# Patient Record
Sex: MALE | Race: WHITE | Employment: OTHER | URBAN - METROPOLITAN AREA
[De-identification: names, ages, dates, MRNs, and addresses within clinical notes are randomized per-mention and may not be internally consistent; named-entity substitution may affect disease eponyms.]

---

## 2018-08-29 ENCOUNTER — TRANSFERRED RECORDS (OUTPATIENT)
Dept: HEALTH INFORMATION MANAGEMENT | Facility: CLINIC | Age: 68
End: 2018-08-29

## 2018-08-29 LAB
ALT SERPL-CCNC: 23 U/L (ref 13–61)
AST SERPL-CCNC: 17 U/L (ref 15–37)
CHOLEST SERPL-MCNC: 211 MG/DL (ref 0–200)
CREAT SERPL-MCNC: 0.9 MG/DL (ref 0.7–1.2)
GFR SERPL CREATININE-BSD FRML MDRD: >60 ML/MIN/1.73M2
GLUCOSE SERPL-MCNC: 104 MG/DL (ref 70–100)
HBA1C MFR BLD: 5.2 % (ref 4–6)
HDLC SERPL-MCNC: 63 MG/DL
LDLC SERPL CALC-MCNC: 132 MG/DL
NONHDLC SERPL-MCNC: 148 MG/DL
POTASSIUM SERPL-SCNC: 4.2 MMOL/L (ref 3.5–5)
TRIGL SERPL-MCNC: 80 MG/DL
TSH SERPL-ACNC: 0.84 UIU/ML (ref 0.3–5)

## 2019-07-09 ENCOUNTER — HOSPITAL ENCOUNTER (INPATIENT)
Facility: HOSPITAL | Age: 69
LOS: 7 days | Discharge: NURSING, REHAB OR LONG-TERM CARE | DRG: 871 | End: 2019-07-16
Attending: FAMILY MEDICINE | Admitting: INTERNAL MEDICINE
Payer: COMMERCIAL

## 2019-07-09 ENCOUNTER — APPOINTMENT (OUTPATIENT)
Dept: GENERAL RADIOLOGY | Facility: HOSPITAL | Age: 69
DRG: 871 | End: 2019-07-09
Attending: FAMILY MEDICINE
Payer: COMMERCIAL

## 2019-07-09 DIAGNOSIS — I50.9 CONGESTIVE HEART FAILURE, UNSPECIFIED HF CHRONICITY, UNSPECIFIED HEART FAILURE TYPE (H): ICD-10-CM

## 2019-07-09 DIAGNOSIS — J18.9 PNEUMONIA OF LEFT LOWER LOBE DUE TO INFECTIOUS ORGANISM: ICD-10-CM

## 2019-07-09 LAB
ALBUMIN SERPL-MCNC: 3.4 G/DL (ref 3.4–5)
ALBUMIN UR-MCNC: 30 MG/DL
ALP SERPL-CCNC: 81 U/L (ref 40–150)
ALT SERPL W P-5'-P-CCNC: 82 U/L (ref 0–70)
ANION GAP SERPL CALCULATED.3IONS-SCNC: 3 MMOL/L (ref 3–14)
ANION GAP SERPL CALCULATED.3IONS-SCNC: 4 MMOL/L (ref 3–14)
APPEARANCE UR: ABNORMAL
AST SERPL W P-5'-P-CCNC: 51 U/L (ref 0–45)
BACTERIA #/AREA URNS HPF: ABNORMAL /HPF
BASOPHILS # BLD AUTO: 0.1 10E9/L (ref 0–0.2)
BASOPHILS NFR BLD AUTO: 0.5 %
BILIRUB SERPL-MCNC: 0.5 MG/DL (ref 0.2–1.3)
BILIRUB UR QL STRIP: NEGATIVE
BUN SERPL-MCNC: 39 MG/DL (ref 7–30)
BUN SERPL-MCNC: 48 MG/DL (ref 7–30)
CALCIUM SERPL-MCNC: 8.5 MG/DL (ref 8.5–10.1)
CALCIUM SERPL-MCNC: 9.5 MG/DL (ref 8.5–10.1)
CHLORIDE SERPL-SCNC: 118 MMOL/L (ref 94–109)
CHLORIDE SERPL-SCNC: 119 MMOL/L (ref 94–109)
CK SERPL-CCNC: 763 U/L (ref 30–300)
CO2 SERPL-SCNC: 29 MMOL/L (ref 20–32)
CO2 SERPL-SCNC: 29 MMOL/L (ref 20–32)
COLOR UR AUTO: YELLOW
CREAT SERPL-MCNC: 0.77 MG/DL (ref 0.66–1.25)
CREAT SERPL-MCNC: 0.94 MG/DL (ref 0.66–1.25)
DIFFERENTIAL METHOD BLD: ABNORMAL
EOSINOPHIL # BLD AUTO: 0 10E9/L (ref 0–0.7)
EOSINOPHIL NFR BLD AUTO: 0 %
ERYTHROCYTE [DISTWIDTH] IN BLOOD BY AUTOMATED COUNT: 17.1 % (ref 10–15)
GFR SERPL CREATININE-BSD FRML MDRD: 83 ML/MIN/{1.73_M2}
GFR SERPL CREATININE-BSD FRML MDRD: >90 ML/MIN/{1.73_M2}
GLUCOSE SERPL-MCNC: 114 MG/DL (ref 70–99)
GLUCOSE SERPL-MCNC: 161 MG/DL (ref 70–99)
GLUCOSE UR STRIP-MCNC: NEGATIVE MG/DL
HCT VFR BLD AUTO: 51.3 % (ref 40–53)
HGB BLD-MCNC: 16.8 G/DL (ref 13.3–17.7)
HGB UR QL STRIP: ABNORMAL
HYALINE CASTS #/AREA URNS LPF: 5 /LPF
IMM GRANULOCYTES # BLD: 0.1 10E9/L (ref 0–0.4)
IMM GRANULOCYTES NFR BLD: 0.7 %
KETONES UR STRIP-MCNC: NEGATIVE MG/DL
LACTATE BLD-SCNC: 2.2 MMOL/L (ref 0.7–2)
LACTATE BLD-SCNC: 2.6 MMOL/L (ref 0.7–2)
LACTATE BLD-SCNC: 3.3 MMOL/L (ref 0.7–2)
LEUKOCYTE ESTERASE UR QL STRIP: ABNORMAL
LYMPHOCYTES # BLD AUTO: 1.5 10E9/L (ref 0.8–5.3)
LYMPHOCYTES NFR BLD AUTO: 7.9 %
MCH RBC QN AUTO: 29.5 PG (ref 26.5–33)
MCHC RBC AUTO-ENTMCNC: 32.7 G/DL (ref 31.5–36.5)
MCV RBC AUTO: 90 FL (ref 78–100)
MONOCYTES # BLD AUTO: 2.1 10E9/L (ref 0–1.3)
MONOCYTES NFR BLD AUTO: 11.2 %
NEUTROPHILS # BLD AUTO: 15 10E9/L (ref 1.6–8.3)
NEUTROPHILS NFR BLD AUTO: 79.7 %
NITRATE UR QL: NEGATIVE
NRBC # BLD AUTO: 0 10*3/UL
NRBC BLD AUTO-RTO: 0 /100
NT-PROBNP SERPL-MCNC: 1610 PG/ML (ref 0–900)
PH UR STRIP: 5.5 PH (ref 4.7–8)
PLATELET # BLD AUTO: 348 10E9/L (ref 150–450)
POTASSIUM SERPL-SCNC: 4 MMOL/L (ref 3.4–5.3)
POTASSIUM SERPL-SCNC: 4.7 MMOL/L (ref 3.4–5.3)
PROCALCITONIN SERPL-MCNC: 0.32 NG/ML
PROT SERPL-MCNC: 8.5 G/DL (ref 6.8–8.8)
RBC # BLD AUTO: 5.7 10E12/L (ref 4.4–5.9)
RBC #/AREA URNS AUTO: 52 /HPF (ref 0–2)
SODIUM SERPL-SCNC: 150 MMOL/L (ref 133–144)
SODIUM SERPL-SCNC: 152 MMOL/L (ref 133–144)
SOURCE: ABNORMAL
SP GR UR STRIP: 1.02 (ref 1–1.03)
UROBILINOGEN UR STRIP-MCNC: NORMAL MG/DL (ref 0–2)
WBC # BLD AUTO: 18.9 10E9/L (ref 4–11)
WBC #/AREA URNS AUTO: >182 /HPF (ref 0–5)
WBC CLUMPS #/AREA URNS HPF: PRESENT /HPF

## 2019-07-09 PROCEDURE — 82550 ASSAY OF CK (CPK): CPT | Performed by: FAMILY MEDICINE

## 2019-07-09 PROCEDURE — 25800030 ZZH RX IP 258 OP 636: Performed by: FAMILY MEDICINE

## 2019-07-09 PROCEDURE — 40000786 ZZHCL STATISTIC ACTIVE MRSA SURVEILLANCE CULTURE: Performed by: INTERNAL MEDICINE

## 2019-07-09 PROCEDURE — 84145 PROCALCITONIN (PCT): CPT | Performed by: FAMILY MEDICINE

## 2019-07-09 PROCEDURE — 94660 CPAP INITIATION&MGMT: CPT

## 2019-07-09 PROCEDURE — 96361 HYDRATE IV INFUSION ADD-ON: CPT

## 2019-07-09 PROCEDURE — 5A09357 ASSISTANCE WITH RESPIRATORY VENTILATION, LESS THAN 24 CONSECUTIVE HOURS, CONTINUOUS POSITIVE AIRWAY PRESSURE: ICD-10-PCS | Performed by: INTERNAL MEDICINE

## 2019-07-09 PROCEDURE — 80053 COMPREHEN METABOLIC PANEL: CPT | Performed by: FAMILY MEDICINE

## 2019-07-09 PROCEDURE — 93010 ELECTROCARDIOGRAM REPORT: CPT | Performed by: INTERNAL MEDICINE

## 2019-07-09 PROCEDURE — 25000128 H RX IP 250 OP 636: Performed by: INTERNAL MEDICINE

## 2019-07-09 PROCEDURE — 81001 URINALYSIS AUTO W/SCOPE: CPT | Performed by: FAMILY MEDICINE

## 2019-07-09 PROCEDURE — 25800030 ZZH RX IP 258 OP 636: Performed by: INTERNAL MEDICINE

## 2019-07-09 PROCEDURE — 25800029 ZZH RX IP 258 OP 250: Performed by: INTERNAL MEDICINE

## 2019-07-09 PROCEDURE — 25000132 ZZH RX MED GY IP 250 OP 250 PS 637: Performed by: INTERNAL MEDICINE

## 2019-07-09 PROCEDURE — 99223 1ST HOSP IP/OBS HIGH 75: CPT | Performed by: INTERNAL MEDICINE

## 2019-07-09 PROCEDURE — 80048 BASIC METABOLIC PNL TOTAL CA: CPT | Performed by: INTERNAL MEDICINE

## 2019-07-09 PROCEDURE — 87040 BLOOD CULTURE FOR BACTERIA: CPT | Performed by: FAMILY MEDICINE

## 2019-07-09 PROCEDURE — 83880 ASSAY OF NATRIURETIC PEPTIDE: CPT | Performed by: FAMILY MEDICINE

## 2019-07-09 PROCEDURE — 36415 COLL VENOUS BLD VENIPUNCTURE: CPT | Performed by: INTERNAL MEDICINE

## 2019-07-09 PROCEDURE — 96365 THER/PROPH/DIAG IV INF INIT: CPT

## 2019-07-09 PROCEDURE — 96367 TX/PROPH/DG ADDL SEQ IV INF: CPT

## 2019-07-09 PROCEDURE — 87088 URINE BACTERIA CULTURE: CPT | Performed by: FAMILY MEDICINE

## 2019-07-09 PROCEDURE — 83605 ASSAY OF LACTIC ACID: CPT | Performed by: FAMILY MEDICINE

## 2019-07-09 PROCEDURE — 40000275 ZZH STATISTIC RCP TIME EA 10 MIN

## 2019-07-09 PROCEDURE — 99285 EMERGENCY DEPT VISIT HI MDM: CPT | Mod: Z6 | Performed by: FAMILY MEDICINE

## 2019-07-09 PROCEDURE — 83605 ASSAY OF LACTIC ACID: CPT | Performed by: INTERNAL MEDICINE

## 2019-07-09 PROCEDURE — 99285 EMERGENCY DEPT VISIT HI MDM: CPT | Mod: 25

## 2019-07-09 PROCEDURE — 25000128 H RX IP 250 OP 636: Performed by: FAMILY MEDICINE

## 2019-07-09 PROCEDURE — 85025 COMPLETE CBC W/AUTO DIFF WBC: CPT | Performed by: FAMILY MEDICINE

## 2019-07-09 PROCEDURE — 25000125 ZZHC RX 250: Performed by: FAMILY MEDICINE

## 2019-07-09 PROCEDURE — 71045 X-RAY EXAM CHEST 1 VIEW: CPT | Mod: TC

## 2019-07-09 PROCEDURE — 94640 AIRWAY INHALATION TREATMENT: CPT

## 2019-07-09 PROCEDURE — 36415 COLL VENOUS BLD VENIPUNCTURE: CPT | Performed by: FAMILY MEDICINE

## 2019-07-09 PROCEDURE — 25000132 ZZH RX MED GY IP 250 OP 250 PS 637: Performed by: FAMILY MEDICINE

## 2019-07-09 PROCEDURE — 87086 URINE CULTURE/COLONY COUNT: CPT | Performed by: FAMILY MEDICINE

## 2019-07-09 PROCEDURE — 20000003 ZZH R&B ICU

## 2019-07-09 PROCEDURE — 93005 ELECTROCARDIOGRAM TRACING: CPT

## 2019-07-09 RX ORDER — GABAPENTIN 300 MG/1
300 CAPSULE ORAL DAILY
Status: DISCONTINUED | OUTPATIENT
Start: 2019-07-10 | End: 2019-07-16 | Stop reason: HOSPADM

## 2019-07-09 RX ORDER — BUPROPION HYDROCHLORIDE 150 MG/1
150 TABLET, EXTENDED RELEASE ORAL 2 TIMES DAILY
Status: DISCONTINUED | OUTPATIENT
Start: 2019-07-10 | End: 2019-07-16 | Stop reason: HOSPADM

## 2019-07-09 RX ORDER — CEFTRIAXONE SODIUM 2 G/50ML
2 INJECTION, SOLUTION INTRAVENOUS ONCE
Status: COMPLETED | OUTPATIENT
Start: 2019-07-09 | End: 2019-07-09

## 2019-07-09 RX ORDER — ONDANSETRON 2 MG/ML
4 INJECTION INTRAMUSCULAR; INTRAVENOUS EVERY 6 HOURS PRN
Status: DISCONTINUED | OUTPATIENT
Start: 2019-07-09 | End: 2019-07-16 | Stop reason: HOSPADM

## 2019-07-09 RX ORDER — NICOTINE 21 MG/24HR
1 PATCH, TRANSDERMAL 24 HOURS TRANSDERMAL DAILY
Status: DISCONTINUED | OUTPATIENT
Start: 2019-07-09 | End: 2019-07-16 | Stop reason: HOSPADM

## 2019-07-09 RX ORDER — SODIUM CHLORIDE 9 MG/ML
1000 INJECTION, SOLUTION INTRAVENOUS CONTINUOUS
Status: DISCONTINUED | OUTPATIENT
Start: 2019-07-09 | End: 2019-07-10

## 2019-07-09 RX ORDER — MIRTAZAPINE 30 MG/1
45 TABLET, FILM COATED ORAL AT BEDTIME
Status: ON HOLD | COMMUNITY
End: 2020-01-01

## 2019-07-09 RX ORDER — NALOXONE HYDROCHLORIDE 0.4 MG/ML
.1-.4 INJECTION, SOLUTION INTRAMUSCULAR; INTRAVENOUS; SUBCUTANEOUS
Status: DISCONTINUED | OUTPATIENT
Start: 2019-07-09 | End: 2019-07-16 | Stop reason: HOSPADM

## 2019-07-09 RX ORDER — TAMSULOSIN HYDROCHLORIDE 0.4 MG/1
0.4 CAPSULE ORAL DAILY
Status: ON HOLD | COMMUNITY
End: 2019-07-09

## 2019-07-09 RX ORDER — ACETAMINOPHEN 325 MG/1
650 TABLET ORAL ONCE
Status: COMPLETED | OUTPATIENT
Start: 2019-07-09 | End: 2019-07-09

## 2019-07-09 RX ORDER — ONDANSETRON 4 MG/1
4 TABLET, ORALLY DISINTEGRATING ORAL EVERY 6 HOURS PRN
Status: DISCONTINUED | OUTPATIENT
Start: 2019-07-09 | End: 2019-07-16 | Stop reason: HOSPADM

## 2019-07-09 RX ORDER — IPRATROPIUM BROMIDE AND ALBUTEROL SULFATE 2.5; .5 MG/3ML; MG/3ML
3 SOLUTION RESPIRATORY (INHALATION)
Status: COMPLETED | OUTPATIENT
Start: 2019-07-09 | End: 2019-07-09

## 2019-07-09 RX ORDER — BUPROPION HYDROCHLORIDE 150 MG/1
150 TABLET, EXTENDED RELEASE ORAL 2 TIMES DAILY
Status: ON HOLD | COMMUNITY
End: 2019-07-09

## 2019-07-09 RX ORDER — MORPHINE SULFATE 4 MG/ML
1 INJECTION, SOLUTION INTRAMUSCULAR; INTRAVENOUS
Status: DISCONTINUED | OUTPATIENT
Start: 2019-07-09 | End: 2019-07-16 | Stop reason: HOSPADM

## 2019-07-09 RX ORDER — CEFTRIAXONE SODIUM 2 G/50ML
2 INJECTION, SOLUTION INTRAVENOUS EVERY 24 HOURS
Status: DISCONTINUED | OUTPATIENT
Start: 2019-07-10 | End: 2019-07-13

## 2019-07-09 RX ORDER — GABAPENTIN 300 MG/1
300 CAPSULE ORAL DAILY
Status: ON HOLD | COMMUNITY
End: 2019-07-09

## 2019-07-09 RX ORDER — TAMSULOSIN HYDROCHLORIDE 0.4 MG/1
0.4 CAPSULE ORAL DAILY
Status: DISCONTINUED | OUTPATIENT
Start: 2019-07-10 | End: 2019-07-16 | Stop reason: HOSPADM

## 2019-07-09 RX ORDER — ARIPIPRAZOLE 5 MG/1
5 TABLET ORAL DAILY
Status: DISCONTINUED | OUTPATIENT
Start: 2019-07-10 | End: 2019-07-16 | Stop reason: HOSPADM

## 2019-07-09 RX ORDER — SODIUM CHLORIDE 450 MG/100ML
INJECTION, SOLUTION INTRAVENOUS CONTINUOUS
Status: DISCONTINUED | OUTPATIENT
Start: 2019-07-09 | End: 2019-07-10

## 2019-07-09 RX ORDER — ARIPIPRAZOLE 5 MG/1
2.5 TABLET ORAL DAILY
Status: ON HOLD | COMMUNITY
End: 2020-01-01

## 2019-07-09 RX ORDER — ACETAMINOPHEN 325 MG/1
325-650 TABLET ORAL EVERY 6 HOURS PRN
Status: ON HOLD | COMMUNITY
End: 2019-07-10

## 2019-07-09 RX ORDER — LIDOCAINE 40 MG/G
CREAM TOPICAL
Status: DISCONTINUED | OUTPATIENT
Start: 2019-07-09 | End: 2019-07-16 | Stop reason: HOSPADM

## 2019-07-09 RX ADMIN — NICOTINE 1 PATCH: 21 PATCH, EXTENDED RELEASE TRANSDERMAL at 17:25

## 2019-07-09 RX ADMIN — IPRATROPIUM BROMIDE AND ALBUTEROL SULFATE 3 ML: .5; 3 SOLUTION RESPIRATORY (INHALATION) at 13:17

## 2019-07-09 RX ADMIN — MIRTAZAPINE 45 MG: 30 TABLET, FILM COATED ORAL at 22:22

## 2019-07-09 RX ADMIN — SODIUM CHLORIDE: 4.5 INJECTION, SOLUTION INTRAVENOUS at 17:25

## 2019-07-09 RX ADMIN — CEFTRIAXONE SODIUM 2 G: 2 INJECTION, SOLUTION INTRAVENOUS at 15:00

## 2019-07-09 RX ADMIN — SODIUM CHLORIDE, POTASSIUM CHLORIDE, SODIUM LACTATE AND CALCIUM CHLORIDE 1000 ML: 600; 310; 30; 20 INJECTION, SOLUTION INTRAVENOUS at 19:57

## 2019-07-09 RX ADMIN — ACETAMINOPHEN 650 MG: 325 TABLET, FILM COATED ORAL at 13:45

## 2019-07-09 RX ADMIN — ENOXAPARIN SODIUM 40 MG: 40 INJECTION SUBCUTANEOUS at 17:25

## 2019-07-09 RX ADMIN — IPRATROPIUM BROMIDE AND ALBUTEROL SULFATE 3 ML: .5; 3 SOLUTION RESPIRATORY (INHALATION) at 13:23

## 2019-07-09 RX ADMIN — AZITHROMYCIN 500 MG: 500 INJECTION, POWDER, LYOPHILIZED, FOR SOLUTION INTRAVENOUS at 13:45

## 2019-07-09 RX ADMIN — SODIUM CHLORIDE 1000 ML: 9 INJECTION, SOLUTION INTRAVENOUS at 13:07

## 2019-07-09 RX ADMIN — IPRATROPIUM BROMIDE AND ALBUTEROL SULFATE 3 ML: .5; 3 SOLUTION RESPIRATORY (INHALATION) at 13:33

## 2019-07-09 ASSESSMENT — ENCOUNTER SYMPTOMS
DIARRHEA: 1
ARTHRALGIAS: 0
CONFUSION: 1
NAUSEA: 0
CONSTIPATION: 0
ABDOMINAL PAIN: 0
ROS GI COMMENTS: INCONTINENT OF STOOL
ACTIVITY CHANGE: 1
FATIGUE: 1
DIAPHORESIS: 1
SHORTNESS OF BREATH: 1
SPEECH DIFFICULTY: 1
VOMITING: 0
COUGH: 1
WHEEZING: 1
PALPITATIONS: 0
FEVER: 1

## 2019-07-09 ASSESSMENT — MIFFLIN-ST. JEOR: SCORE: 1388.63

## 2019-07-09 ASSESSMENT — ACTIVITIES OF DAILY LIVING (ADL): ADLS_ACUITY_SCORE: 20

## 2019-07-09 NOTE — ED NOTES
Patient being admitted with pneumonia.  Found down by a family member covered in feces and urine.  Down-time unknown.  I have called him in to the [facility] referral desk.      Care Transitions will follow on the floor.  Pt does have a mental health history.  No information in our system.    Yancy Giron, ALTAF   Care Transitions

## 2019-07-09 NOTE — ED PROVIDER NOTES
History     Chief Complaint   Patient presents with     Fall     HPI  Tim Watson is a 68 year old male who presents emergency room after having been found on the floor by his  this morning.  Someone had contact with him yesterday, presumably he was doing all right until yesterday evening, unknown when he actually fell.  Patient has coarse respirations, is covered with stool and urine.  Respiratory rate is in the 40s, patient was placed on BiPAP almost immediately on arrival.  Patient speech is very difficult to understand due to loose dentures, he does not seem to be entirely alert and oriented, but is very difficult to figure out what he is saying.  At one point the conversation was about Robin Hurley.  [facility] and we have very little history on him.  He does have a mental health history.    Allergies:  Allergies   Allergen Reactions     Contrast Dye        Problem List:    There are no active problems to display for this patient.       Past Medical History:    No past medical history on file.    Past Surgical History:    No past surgical history on file.    Family History:    No family history on file.    Social History:  Marital Status:   [2]  Social History     Tobacco Use     Smoking status: Current Every Day Smoker     Packs/day: 1.50     Years: 40.00     Pack years: 60.00     Smokeless tobacco: Never Used   Substance Use Topics     Alcohol use: No     Drug use: No        Medications:      mirtazapine (REMERON) 30 MG tablet   acetaminophen (TYLENOL) 325 MG tablet   ARIPiprazole (ABILIFY) 5 MG tablet   buPROPion (WELLBUTRIN SR) 150 MG 12 hr tablet   cinnamon 500 MG TABS   gabapentin (NEURONTIN) 300 MG capsule   tamsulosin (FLOMAX) 0.4 MG capsule         Review of Systems   Constitutional: Positive for activity change, diaphoresis, fatigue and fever.   HENT: Negative.    Respiratory: Positive for cough, shortness of breath and wheezing.    Cardiovascular: Negative for  chest pain and palpitations.   Gastrointestinal: Positive for diarrhea. Negative for abdominal pain, constipation, nausea and vomiting.        Incontinent of stool   Genitourinary:        Incontinent of urine   Musculoskeletal: Negative for arthralgias.   Skin: Positive for rash.        Areas of irritation in the skin in the creases of the legs and the coccyx due to being in wet clothing with stool on him.   Neurological: Positive for speech difficulty.        Chronic   Psychiatric/Behavioral: Positive for confusion.       Physical Exam   BP: 118/93  Pulse: (!) 133  Temp: (!) 101.6  F (38.7  C)  Resp: (!) 44  SpO2: (!) 91 %      Physical Exam   Constitutional: He appears well-developed and well-nourished. He appears distressed.   HENT:   Head: Normocephalic and atraumatic.   Evidence of old head trauma with scarring on the occipital area.   Neck: Normal range of motion. Neck supple.   Cardiovascular: Normal rate, regular rhythm and normal heart sounds.   No murmur heard.  Pulmonary/Chest: He is in respiratory distress. He has decreased breath sounds in the right lower field and the left lower field. He has rhonchi.   Abdominal: Soft. Bowel sounds are normal. He exhibits no distension. There is no tenderness.   Musculoskeletal: Normal range of motion.   Right lower leg surgically absent   Neurological: He is alert. No sensory deficit.   Skin: Skin is warm. Capillary refill takes less than 2 seconds. He is diaphoretic. There is pallor.   Psychiatric: He has a normal mood and affect. His behavior is normal. His speech is slurred. Cognition and memory are impaired.   Nursing note and vitals reviewed.      ED Course        Procedures          EKG Interpretation:      Interpreted by Mariana Meehan  Time reviewed: 1315  Symptoms at time of EKG: extreme dyspnea   Rhythm: sinus tachycardia  Rate: 115-149  Axis: normal  Ectopy: none  Conduction: normal  ST Segments/ T Waves: No ST-T wave changes  Q Waves:  none  Comparison to prior: No old EKG available    Clinical Impression: normal EKG    The Lactic acid level is elevated due to infection, at this time there is no sign of severe sepsis or septic shock.         Results for orders placed or performed during the hospital encounter of 07/09/19 (from the past 24 hour(s))   CBC with platelets differential   Result Value Ref Range    WBC 18.9 (H) 4.0 - 11.0 10e9/L    RBC Count 5.70 4.4 - 5.9 10e12/L    Hemoglobin 16.8 13.3 - 17.7 g/dL    Hematocrit 51.3 40.0 - 53.0 %    MCV 90 78 - 100 fl    MCH 29.5 26.5 - 33.0 pg    MCHC 32.7 31.5 - 36.5 g/dL    RDW 17.1 (H) 10.0 - 15.0 %    Platelet Count 348 150 - 450 10e9/L    Diff Method Automated Method     % Neutrophils 79.7 %    % Lymphocytes 7.9 %    % Monocytes 11.2 %    % Eosinophils 0.0 %    % Basophils 0.5 %    % Immature Granulocytes 0.7 %    Nucleated RBCs 0 0 /100    Absolute Neutrophil 15.0 (H) 1.6 - 8.3 10e9/L    Absolute Lymphocytes 1.5 0.8 - 5.3 10e9/L    Absolute Monocytes 2.1 (H) 0.0 - 1.3 10e9/L    Absolute Eosinophils 0.0 0.0 - 0.7 10e9/L    Absolute Basophils 0.1 0.0 - 0.2 10e9/L    Abs Immature Granulocytes 0.1 0 - 0.4 10e9/L    Absolute Nucleated RBC 0.0    Comprehensive metabolic panel   Result Value Ref Range    Sodium 150 (H) 133 - 144 mmol/L    Potassium 4.7 3.4 - 5.3 mmol/L    Chloride 118 (H) 94 - 109 mmol/L    Carbon Dioxide 29 20 - 32 mmol/L    Anion Gap 3 3 - 14 mmol/L    Glucose 161 (H) 70 - 99 mg/dL    Urea Nitrogen 48 (H) 7 - 30 mg/dL    Creatinine 0.94 0.66 - 1.25 mg/dL    GFR Estimate 83 >60 mL/min/[1.73_m2]    GFR Estimate If Black >90 >60 mL/min/[1.73_m2]    Calcium 9.5 8.5 - 10.1 mg/dL    Bilirubin Total 0.5 0.2 - 1.3 mg/dL    Albumin 3.4 3.4 - 5.0 g/dL    Protein Total 8.5 6.8 - 8.8 g/dL    Alkaline Phosphatase 81 40 - 150 U/L    ALT 82 (H) 0 - 70 U/L    AST 51 (H) 0 - 45 U/L   Lactic acid whole blood   Result Value Ref Range    Lactic Acid 3.3 (H) 0.7 - 2.0 mmol/L   Nt probnp inpatient    Result Value Ref Range    N-Terminal Pro BNP Inpatient 1,610 (H) 0 - 900 pg/mL   Procalcitonin   Result Value Ref Range    Procalcitonin 0.32 ng/ml   CK total   Result Value Ref Range    CK Total 763 (H) 30 - 300 U/L   XR Chest Port 1 View    Narrative    Procedure:XR CHEST PORT 1 VW    Clinical history:Male, 68 years, dyspnea    Technique: Single view was obtained.    Comparison: 9/25/2013    Findings: The cardiac silhouette is normal. The pulmonary vasculature  is normal.    The lungs demonstrates slight increase in density at the left lung  base. Bony structures are unremarkable.      Impression    Impression:   Slight increase in density at the left lung base, new when compared  to the previous study suggesting subtle left lower lobe pneumonia.    NAS GRACE MD       Medications   0.9% sodium chloride BOLUS (0 mLs Intravenous Stopped 7/9/19 1407)     Followed by   sodium chloride 0.9% infusion (has no administration in time range)   cefTRIAXone IN D5W (ROCEPHIN) intermittent infusion 2 g (has no administration in time range)   azithromycin (ZITHROMAX) 500 mg in sodium chloride 0.9 % 250 mL intermittent infusion (500 mg Intravenous New Bag 7/9/19 1345)   ipratropium - albuterol 0.5 mg/2.5 mg/3 mL (DUONEB) neb solution 3 mL (3 mLs Nebulization Given 7/9/19 1333)   acetaminophen (TYLENOL) tablet 650 mg (650 mg Oral Given 7/9/19 1345)       Assessments & Plan (with Medical Decision Making)   Patient has pneumonia and possibly some congestive heart failure.  His respiratory rate remains in the 40s and he is mentating poorly at times but this is not unusual according to his family member who is here.  White count is elevated at 18.9 with a left shift and an ANC of 15, lactic acid is 3.3 with a procalcitonin of 0.32.  Blood pressures remained stable throughout his time here.  He is tachycardic at 133.  Given some normal saline but saline was stopped with a proBNP of 1610 and a sodium of 150.  Spoke with   Cleveland Goldstein, he will admit the patient for further evaluation and treatment on the medical floor.    I have reviewed the nursing notes.    I have reviewed the findings, diagnosis, plan and need for follow up with the patient.     Medication List      There are no discharge medications for this visit.         Final diagnoses:   Pneumonia of left lower lobe due to infectious organism (H)   Congestive heart failure, unspecified HF chronicity, unspecified heart failure type (H)       7/9/2019   [facility]     Mariana Goff MD  07/09/19 4230

## 2019-07-09 NOTE — PROVIDER NOTIFICATION
Dr. Lino notified of HR continuing to be in the 130's and of needing a repeat lactic acid. No new orders for his HR, will continue to monitor. Dr. Lino will order repeat lactic.

## 2019-07-09 NOTE — ED NOTES
DATE:  7/9/2019   TIME OF RECEIPT FROM LAB:  4091  LAB TEST:  lactic  LAB VALUE:  3.3  RESULTS GIVEN WITH READ-BACK TO (PROVIDER):  Dr. Mcallister  TIME LAB VALUE REPORTED TO PROVIDER:   5762

## 2019-07-09 NOTE — PLAN OF CARE
[facility] Inpatient Admission Note:    Patient admitted to 3126/3126-1 at approximately 1615 via cart accompanied by nurse from emergency room . Report received from Michael in SBAR format at 1528 via telephone. Patient transferred to bed via slide board.. Patient is alert and oriented X 3, denies pain; rates at 0 on 0-10 scale.  Patient oriented to room, unit, hourly rounding, and plan of care. Explained admission packet and patient handbook with patient bill of rights brochure. Will continue to monitor and document as needed.     Inpatient Nursing criteria listed below was met:    Health care directives status obtained and documented: Yes    Care Everywhere authorization obtained No    MRSA swab completed for patient 65 years and older: Yes    Patient identifies a surrogate decision maker: No If yes, who:n/a Contact Information:n/a    Core Measure diagnosis present:No     If initial lactic acid >2.0, repeat lactic acid drawn within one hour of arrival to unit: No. If no, state reason: Provider notified    Vaccination assessment and education completed: n/a   Vaccinations received prior to admission: Pneumovax n/a  Influenza(seasonal)  N/A   Vaccination(s) ordered: n/a    Clergy visit ordered if patient requests: Yes    Skin issues/needs documented: Yes    Isolation Patient: no Education given, correct sign in place and documentation row added to PCS:  n/a    Fall Prevention Yes: Care plan updated, education given and documented, sticker and magnet in place: Yes    Care Plan initiated: Yes    Education Documented (including assessment): Yes    Patient has discharge needs : Yes If yes, please explain:potential placement

## 2019-07-09 NOTE — ED TRIAGE NOTES
Pt brought in by EMS for complaints of a fall and altered mentation. Pt found in the bathroom on the floor by family this am. Pt was on the floor for an unknown period of time. Pt alert but confused on arrival and was incontinent of both bowel and bladder.
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
## 2019-07-09 NOTE — ED NOTES
Call to [facility] clinic to obtain a problem list and H&P.  They will fax to the ED.    ALTAF Francis   Care Transitions

## 2019-07-10 LAB
ALBUMIN SERPL-MCNC: 2.7 G/DL (ref 3.4–5)
ALP SERPL-CCNC: 67 U/L (ref 40–150)
ALT SERPL W P-5'-P-CCNC: 60 U/L (ref 0–70)
ANION GAP SERPL CALCULATED.3IONS-SCNC: 5 MMOL/L (ref 3–14)
AST SERPL W P-5'-P-CCNC: 49 U/L (ref 0–45)
BASOPHILS # BLD AUTO: 0.1 10E9/L (ref 0–0.2)
BASOPHILS NFR BLD AUTO: 0.5 %
BILIRUB SERPL-MCNC: 0.9 MG/DL (ref 0.2–1.3)
BUN SERPL-MCNC: 36 MG/DL (ref 7–30)
CALCIUM SERPL-MCNC: 8.6 MG/DL (ref 8.5–10.1)
CHLORIDE SERPL-SCNC: 119 MMOL/L (ref 94–109)
CO2 SERPL-SCNC: 28 MMOL/L (ref 20–32)
CREAT SERPL-MCNC: 0.72 MG/DL (ref 0.66–1.25)
DIFFERENTIAL METHOD BLD: ABNORMAL
EOSINOPHIL # BLD AUTO: 0.2 10E9/L (ref 0–0.7)
EOSINOPHIL NFR BLD AUTO: 1.2 %
ERYTHROCYTE [DISTWIDTH] IN BLOOD BY AUTOMATED COUNT: 16.3 % (ref 10–15)
GFR SERPL CREATININE-BSD FRML MDRD: >90 ML/MIN/{1.73_M2}
GLUCOSE BLDC GLUCOMTR-MCNC: 141 MG/DL (ref 70–99)
GLUCOSE SERPL-MCNC: 128 MG/DL (ref 70–99)
HCT VFR BLD AUTO: 44 % (ref 40–53)
HGB BLD-MCNC: 14.3 G/DL (ref 13.3–17.7)
IMM GRANULOCYTES # BLD: 0.1 10E9/L (ref 0–0.4)
IMM GRANULOCYTES NFR BLD: 0.4 %
LYMPHOCYTES # BLD AUTO: 3.1 10E9/L (ref 0.8–5.3)
LYMPHOCYTES NFR BLD AUTO: 21.1 %
MCH RBC QN AUTO: 29.7 PG (ref 26.5–33)
MCHC RBC AUTO-ENTMCNC: 32.5 G/DL (ref 31.5–36.5)
MCV RBC AUTO: 91 FL (ref 78–100)
MONOCYTES # BLD AUTO: 1.3 10E9/L (ref 0–1.3)
MONOCYTES NFR BLD AUTO: 8.9 %
NEUTROPHILS # BLD AUTO: 9.9 10E9/L (ref 1.6–8.3)
NEUTROPHILS NFR BLD AUTO: 67.9 %
NRBC # BLD AUTO: 0 10*3/UL
NRBC BLD AUTO-RTO: 0 /100
PLATELET # BLD AUTO: 274 10E9/L (ref 150–450)
POTASSIUM SERPL-SCNC: 3.6 MMOL/L (ref 3.4–5.3)
PROT SERPL-MCNC: 6.9 G/DL (ref 6.8–8.8)
RBC # BLD AUTO: 4.82 10E12/L (ref 4.4–5.9)
SODIUM SERPL-SCNC: 152 MMOL/L (ref 133–144)
WBC # BLD AUTO: 14.6 10E9/L (ref 4–11)

## 2019-07-10 PROCEDURE — 25000128 H RX IP 250 OP 636: Performed by: INTERNAL MEDICINE

## 2019-07-10 PROCEDURE — 20000003 ZZH R&B ICU

## 2019-07-10 PROCEDURE — 25000125 ZZHC RX 250

## 2019-07-10 PROCEDURE — 36415 COLL VENOUS BLD VENIPUNCTURE: CPT | Performed by: INTERNAL MEDICINE

## 2019-07-10 PROCEDURE — 00000146 ZZHCL STATISTIC GLUCOSE BY METER IP

## 2019-07-10 PROCEDURE — 99232 SBSQ HOSP IP/OBS MODERATE 35: CPT | Performed by: INTERNAL MEDICINE

## 2019-07-10 PROCEDURE — 25000132 ZZH RX MED GY IP 250 OP 250 PS 637: Mod: GY | Performed by: INTERNAL MEDICINE

## 2019-07-10 PROCEDURE — 25800029 ZZH RX IP 258 OP 250: Performed by: INTERNAL MEDICINE

## 2019-07-10 PROCEDURE — 80053 COMPREHEN METABOLIC PANEL: CPT | Performed by: INTERNAL MEDICINE

## 2019-07-10 PROCEDURE — 25800030 ZZH RX IP 258 OP 636: Performed by: INTERNAL MEDICINE

## 2019-07-10 PROCEDURE — 85025 COMPLETE CBC W/AUTO DIFF WBC: CPT | Performed by: INTERNAL MEDICINE

## 2019-07-10 RX ORDER — METOPROLOL TARTRATE 1 MG/ML
2.5 INJECTION, SOLUTION INTRAVENOUS ONCE
Status: COMPLETED | OUTPATIENT
Start: 2019-07-10 | End: 2019-07-10

## 2019-07-10 RX ORDER — DEXTROSE MONOHYDRATE 50 MG/ML
INJECTION, SOLUTION INTRAVENOUS CONTINUOUS
Status: DISCONTINUED | OUTPATIENT
Start: 2019-07-10 | End: 2019-07-11

## 2019-07-10 RX ORDER — MORPHINE SULFATE 4 MG/ML
2 INJECTION, SOLUTION INTRAMUSCULAR; INTRAVENOUS ONCE
Status: COMPLETED | OUTPATIENT
Start: 2019-07-10 | End: 2019-07-10

## 2019-07-10 RX ORDER — METOPROLOL TARTRATE 1 MG/ML
INJECTION, SOLUTION INTRAVENOUS
Status: COMPLETED
Start: 2019-07-10 | End: 2019-07-10

## 2019-07-10 RX ADMIN — MIRTAZAPINE 45 MG: 30 TABLET, FILM COATED ORAL at 20:01

## 2019-07-10 RX ADMIN — MORPHINE SULFATE 1 MG: 4 INJECTION INTRAVENOUS at 20:04

## 2019-07-10 RX ADMIN — DEXTROSE MONOHYDRATE: 50 INJECTION, SOLUTION INTRAVENOUS at 21:05

## 2019-07-10 RX ADMIN — MORPHINE SULFATE 2 MG: 4 INJECTION, SOLUTION INTRAMUSCULAR; INTRAVENOUS at 22:45

## 2019-07-10 RX ADMIN — CEFTRIAXONE SODIUM 2 G: 2 INJECTION, SOLUTION INTRAVENOUS at 17:00

## 2019-07-10 RX ADMIN — DEXTROSE AND SODIUM CHLORIDE 1000 ML: 5; 200 INJECTION, SOLUTION INTRAVENOUS at 05:02

## 2019-07-10 RX ADMIN — ENOXAPARIN SODIUM 40 MG: 40 INJECTION SUBCUTANEOUS at 15:36

## 2019-07-10 RX ADMIN — BUPROPION HYDROCHLORIDE 150 MG: 150 TABLET, FILM COATED, EXTENDED RELEASE ORAL at 20:02

## 2019-07-10 RX ADMIN — METOPROLOL TARTRATE 2.5 MG: 1 INJECTION, SOLUTION INTRAVENOUS at 21:04

## 2019-07-10 RX ADMIN — NICOTINE 1 PATCH: 21 PATCH, EXTENDED RELEASE TRANSDERMAL at 10:39

## 2019-07-10 RX ADMIN — SODIUM CHLORIDE: 4.5 INJECTION, SOLUTION INTRAVENOUS at 03:31

## 2019-07-10 RX ADMIN — METOPROLOL TARTRATE 2.5 MG: 5 INJECTION, SOLUTION INTRAVENOUS at 21:04

## 2019-07-10 RX ADMIN — AZITHROMYCIN MONOHYDRATE 250 MG: 500 INJECTION, POWDER, LYOPHILIZED, FOR SOLUTION INTRAVENOUS at 15:26

## 2019-07-10 ASSESSMENT — ACTIVITIES OF DAILY LIVING (ADL)
ADLS_ACUITY_SCORE: 24
ADLS_ACUITY_SCORE: 20
ADLS_ACUITY_SCORE: 20
ADLS_ACUITY_SCORE: 25
ADLS_ACUITY_SCORE: 24
ADLS_ACUITY_SCORE: 24

## 2019-07-10 ASSESSMENT — MIFFLIN-ST. JEOR: SCORE: 1413.63

## 2019-07-10 NOTE — PROVIDER NOTIFICATION
DATE:  7/10/2019   TIME OF RECEIPT FROM LAB:  0442  LAB TEST:  Sodium   LAB VALUE:  152  RESULTS GIVEN WITH READ-BACK TO (PROVIDER):  Dr. Lino   TIME LAB VALUE REPORTED TO PROVIDER:   0444     Sent via text page

## 2019-07-10 NOTE — PROGRESS NOTES
DATE:  7/10/2019   TIME OF RECEIPT FROM LAB:  9422  LAB TEST:  Lactic Acid   LAB VALUE:  2.2   RESULTS GIVEN WITH READ-BACK TO (PROVIDER):  Dr. Lino   TIME LAB VALUE REPORTED TO PROVIDER:   1256

## 2019-07-10 NOTE — PLAN OF CARE
Patient has had a really good night.  He is alert and oriented to all but place. He occasionally is forgetful to time but easily reoriented.  Vitally he is ST in the 120's to 130's.  His BP is 115/92.  RR is 20-30 and O2 sat is 98% on room air.  The patients lungs were course and diminished.  He continues to be incontinent frequently.  However, his excoriated groin is looking much better.  He has D5 0.2 NS going at 100 mL/hr, which was ordered after a Na of 152.  Will continue to monitor.

## 2019-07-10 NOTE — PLAN OF CARE
Today pt would like to be visited by a .  Medications need to be clarified with sister Carmen, she was going to go and look at them last night.  Pt also needs a social work consult to talk about discharge plans, family felt he could possibly benefit from placement.  Will continue to monitor patient.
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
## 2019-07-10 NOTE — PLAN OF CARE
Pt presented to the floor with VSS, denying pain. Bipap on, pt tolerated it until about 1840, and asked to take it off. Pt is stable on 3L O2. Lungs coarse. Family present in room.     Face to face report given with opportunity to observe patient.    Report given to MARIEL Clifton.     Sarah Brooks   7/9/2019  7:46 PM

## 2019-07-10 NOTE — PROVIDER NOTIFICATION
DATE:  7/10/2019   TIME OF RECEIPT FROM LAB:  2330   LAB TEST:  Na  LAB VALUE:  152  [facility]  TIME LAB VALUE REPORTED TO PROVIDER:   2340    Also notified of VS including HR, BP, RR, O2 Sats, & incontinence.  Fluids to be continued as ordered.

## 2019-07-10 NOTE — PLAN OF CARE
Visited with sisters and they say pt only takes his remeron and abilify at home. Pt refused meds this AM. Pt also states has a cleaning lady only however sisters state that they do all the groceries.
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
## 2019-07-10 NOTE — PLAN OF CARE
CAIO HERNANDEZ  Patient visit during  rounds. Patient was with one of his sisters.  Patient's speech somewhat garbled. Patient is Restorationist and a  was up this morning to visit. Closed time with prayer. No other spiritual care requests at this time.

## 2019-07-11 ENCOUNTER — HOSPITAL ENCOUNTER (INPATIENT)
Dept: CARDIOLOGY | Facility: HOSPITAL | Age: 69
DRG: 871 | End: 2019-07-11
Attending: INTERNAL MEDICINE
Payer: COMMERCIAL

## 2019-07-11 ENCOUNTER — APPOINTMENT (OUTPATIENT)
Dept: NUCLEAR MEDICINE | Facility: HOSPITAL | Age: 69
DRG: 871 | End: 2019-07-11
Attending: INTERNAL MEDICINE
Payer: COMMERCIAL

## 2019-07-11 ENCOUNTER — APPOINTMENT (OUTPATIENT)
Dept: GENERAL RADIOLOGY | Facility: HOSPITAL | Age: 69
DRG: 871 | End: 2019-07-11
Attending: RADIOLOGY
Payer: COMMERCIAL

## 2019-07-11 LAB
ALBUMIN SERPL-MCNC: 2.6 G/DL (ref 3.4–5)
ALP SERPL-CCNC: 65 U/L (ref 40–150)
ALT SERPL W P-5'-P-CCNC: 50 U/L (ref 0–70)
ANION GAP SERPL CALCULATED.3IONS-SCNC: 4 MMOL/L (ref 3–14)
AST SERPL W P-5'-P-CCNC: 33 U/L (ref 0–45)
BACTERIA SPEC CULT: NORMAL
BASOPHILS # BLD AUTO: 0.1 10E9/L (ref 0–0.2)
BASOPHILS NFR BLD AUTO: 0.4 %
BILIRUB SERPL-MCNC: 0.4 MG/DL (ref 0.2–1.3)
BUN SERPL-MCNC: 23 MG/DL (ref 7–30)
CALCIUM SERPL-MCNC: 8.4 MG/DL (ref 8.5–10.1)
CHLORIDE SERPL-SCNC: 113 MMOL/L (ref 94–109)
CO2 SERPL-SCNC: 28 MMOL/L (ref 20–32)
CREAT SERPL-MCNC: 0.67 MG/DL (ref 0.66–1.25)
D DIMER PPP DDU-MCNC: <200 NG/ML D-DU (ref 0–300)
DIFFERENTIAL METHOD BLD: ABNORMAL
EOSINOPHIL # BLD AUTO: 0.3 10E9/L (ref 0–0.7)
EOSINOPHIL NFR BLD AUTO: 2 %
ERYTHROCYTE [DISTWIDTH] IN BLOOD BY AUTOMATED COUNT: 15.7 % (ref 10–15)
GFR SERPL CREATININE-BSD FRML MDRD: >90 ML/MIN/{1.73_M2}
GLUCOSE SERPL-MCNC: 148 MG/DL (ref 70–99)
HCT VFR BLD AUTO: 41.9 % (ref 40–53)
HGB BLD-MCNC: 13.9 G/DL (ref 13.3–17.7)
IMM GRANULOCYTES # BLD: 0.1 10E9/L (ref 0–0.4)
IMM GRANULOCYTES NFR BLD: 0.5 %
LACTATE BLD-SCNC: 1.5 MMOL/L (ref 0.7–2)
LYMPHOCYTES # BLD AUTO: 2.3 10E9/L (ref 0.8–5.3)
LYMPHOCYTES NFR BLD AUTO: 16.2 %
MCH RBC QN AUTO: 30 PG (ref 26.5–33)
MCHC RBC AUTO-ENTMCNC: 33.2 G/DL (ref 31.5–36.5)
MCV RBC AUTO: 90 FL (ref 78–100)
MONOCYTES # BLD AUTO: 1.2 10E9/L (ref 0–1.3)
MONOCYTES NFR BLD AUTO: 8.7 %
NEUTROPHILS # BLD AUTO: 10.1 10E9/L (ref 1.6–8.3)
NEUTROPHILS NFR BLD AUTO: 72.2 %
NRBC # BLD AUTO: 0 10*3/UL
NRBC BLD AUTO-RTO: 0 /100
PLATELET # BLD AUTO: 254 10E9/L (ref 150–450)
POTASSIUM SERPL-SCNC: 3.4 MMOL/L (ref 3.4–5.3)
PROT SERPL-MCNC: 6.9 G/DL (ref 6.8–8.8)
RBC # BLD AUTO: 4.64 10E12/L (ref 4.4–5.9)
SODIUM SERPL-SCNC: 145 MMOL/L (ref 133–144)
SPECIMEN SOURCE: NORMAL
WBC # BLD AUTO: 14 10E9/L (ref 4–11)

## 2019-07-11 PROCEDURE — 80053 COMPREHEN METABOLIC PANEL: CPT | Performed by: INTERNAL MEDICINE

## 2019-07-11 PROCEDURE — A9540 TC99M MAA: HCPCS | Performed by: RADIOLOGY

## 2019-07-11 PROCEDURE — 20000003 ZZH R&B ICU

## 2019-07-11 PROCEDURE — 93321 DOPPLER ECHO F-UP/LMTD STD: CPT | Mod: 26 | Performed by: INTERNAL MEDICINE

## 2019-07-11 PROCEDURE — 93005 ELECTROCARDIOGRAM TRACING: CPT

## 2019-07-11 PROCEDURE — 83605 ASSAY OF LACTIC ACID: CPT | Performed by: INTERNAL MEDICINE

## 2019-07-11 PROCEDURE — 71045 X-RAY EXAM CHEST 1 VIEW: CPT | Mod: TC

## 2019-07-11 PROCEDURE — 93325 DOPPLER ECHO COLOR FLOW MAPG: CPT | Mod: 26 | Performed by: INTERNAL MEDICINE

## 2019-07-11 PROCEDURE — 25000128 H RX IP 250 OP 636: Performed by: INTERNAL MEDICINE

## 2019-07-11 PROCEDURE — 85025 COMPLETE CBC W/AUTO DIFF WBC: CPT | Performed by: INTERNAL MEDICINE

## 2019-07-11 PROCEDURE — 25800030 ZZH RX IP 258 OP 636: Performed by: INTERNAL MEDICINE

## 2019-07-11 PROCEDURE — 93010 ELECTROCARDIOGRAM REPORT: CPT | Performed by: INTERNAL MEDICINE

## 2019-07-11 PROCEDURE — 36415 COLL VENOUS BLD VENIPUNCTURE: CPT | Performed by: INTERNAL MEDICINE

## 2019-07-11 PROCEDURE — 85379 FIBRIN DEGRADATION QUANT: CPT | Performed by: INTERNAL MEDICINE

## 2019-07-11 PROCEDURE — A9567 TECHNETIUM TC-99M AEROSOL: HCPCS | Performed by: RADIOLOGY

## 2019-07-11 PROCEDURE — 25000132 ZZH RX MED GY IP 250 OP 250 PS 637: Performed by: INTERNAL MEDICINE

## 2019-07-11 PROCEDURE — 93308 TTE F-UP OR LMTD: CPT | Mod: 26 | Performed by: INTERNAL MEDICINE

## 2019-07-11 PROCEDURE — 25000125 ZZHC RX 250: Performed by: INTERNAL MEDICINE

## 2019-07-11 PROCEDURE — 93308 TTE F-UP OR LMTD: CPT | Mod: TC

## 2019-07-11 PROCEDURE — 34300033 ZZH RX 343: Performed by: RADIOLOGY

## 2019-07-11 PROCEDURE — 99233 SBSQ HOSP IP/OBS HIGH 50: CPT | Performed by: INTERNAL MEDICINE

## 2019-07-11 PROCEDURE — 27210210 NM LUNG SCAN VENTILATION AND PERFUSION: Mod: TC

## 2019-07-11 RX ADMIN — BUPROPION HYDROCHLORIDE 150 MG: 150 TABLET, FILM COATED, EXTENDED RELEASE ORAL at 20:01

## 2019-07-11 RX ADMIN — BUPROPION HYDROCHLORIDE 150 MG: 150 TABLET, FILM COATED, EXTENDED RELEASE ORAL at 08:53

## 2019-07-11 RX ADMIN — AZITHROMYCIN MONOHYDRATE 250 MG: 500 INJECTION, POWDER, LYOPHILIZED, FOR SOLUTION INTRAVENOUS at 13:21

## 2019-07-11 RX ADMIN — CEFTRIAXONE SODIUM 2 G: 2 INJECTION, SOLUTION INTRAVENOUS at 16:42

## 2019-07-11 RX ADMIN — TAMSULOSIN HYDROCHLORIDE 0.4 MG: 0.4 CAPSULE ORAL at 08:54

## 2019-07-11 RX ADMIN — DILTIAZEM HYDROCHLORIDE 15 MG/HR: 5 INJECTION INTRAVENOUS at 20:57

## 2019-07-11 RX ADMIN — DILTIAZEM HYDROCHLORIDE 5 MG/HR: 5 INJECTION INTRAVENOUS at 12:30

## 2019-07-11 RX ADMIN — GABAPENTIN 300 MG: 300 CAPSULE ORAL at 08:54

## 2019-07-11 RX ADMIN — NICOTINE 1 PATCH: 21 PATCH, EXTENDED RELEASE TRANSDERMAL at 09:06

## 2019-07-11 RX ADMIN — MIRTAZAPINE 45 MG: 30 TABLET, FILM COATED ORAL at 20:01

## 2019-07-11 RX ADMIN — Medication 1.42 MILLICURIE: at 15:15

## 2019-07-11 RX ADMIN — ENOXAPARIN SODIUM 40 MG: 40 INJECTION SUBCUTANEOUS at 16:42

## 2019-07-11 RX ADMIN — KIT FOR THE PREPARATION OF TECHNETIUM TC 99M PENTETATE 50 MILLICURIE: 20 INJECTION, POWDER, LYOPHILIZED, FOR SOLUTION INTRAVENOUS; RESPIRATORY (INHALATION) at 14:02

## 2019-07-11 RX ADMIN — ARIPIPRAZOLE 5 MG: 5 TABLET ORAL at 08:53

## 2019-07-11 RX ADMIN — DEXTROSE MONOHYDRATE: 50 INJECTION, SOLUTION INTRAVENOUS at 10:37

## 2019-07-11 ASSESSMENT — ACTIVITIES OF DAILY LIVING (ADL)
ADLS_ACUITY_SCORE: 22
ADLS_ACUITY_SCORE: 23
ADLS_ACUITY_SCORE: 23
ADLS_ACUITY_SCORE: 24
ADLS_ACUITY_SCORE: 24
ADLS_ACUITY_SCORE: 22

## 2019-07-11 ASSESSMENT — MIFFLIN-ST. JEOR: SCORE: 1403.63

## 2019-07-11 NOTE — PROVIDER NOTIFICATION
Notified Dr. Lino of Sepsis Alert promt.  Dr at bedside.  Ordered D5W at 75 mL/hr for hypernatremia.  He ordered 2.5 mg metoprolol for elevated HR and 2 mg of morphine for work of breathing.  Will continue to monitor.

## 2019-07-11 NOTE — PROCEDURES
Tim Watson 68 year old    Returned from DI- Nuclear Medicine   Exam Performed : Nuclear Medicine VQ (Lung Perfusion and Ventilation)  Tolerated Procedure : with mild difficulty  May resume previous diet : Yes  Time Returned to Unit : 1640  Report Given to : Rebeca FLOYD was with patient entire time.       Patient complained of back discomfort and did not want to lay on imaging table.    Patient had difficulty keeping aerosal/vent tubing in mouth for ventilation of isotope.    Radioactive precautions discussed and sign hung on bathroom door.    7/11/2019 4:42 PM   Sri Vivar

## 2019-07-11 NOTE — PLAN OF CARE
Pt is alert though sleeping most of the day absolutely refusing to get out of bed. Disoriented to situation. Pt tachypneic, maintaining sats on RA. Lungs are coarse with expiratory wheezes. At start of shift pt HR 's. Cardizem gtt initiated at 1230 and titrated to 15 mg/hr with 's. Maintaining SBP > 90. Pt down for VQ scan today, tolerating it fairly. Incontinent of stool/urine. Pt refusing most food with exception of ensures.     Face to face report given with opportunity to observe patient.    Report given to Elodia Dunlap   7/11/2019  7:30 PM
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
## 2019-07-11 NOTE — PROGRESS NOTES
"Provided sister Koko with the HCD and POA information requested yesterday.     She would like him to discharge to a SNF, prefers \"any place that will work with his behavior!\".     Referrals:  [facility]-faxed and left message  [facility]-screening  [facility] ([facility])-no beds at this time; also would decline d/t smoking  [facility] ([facility])-screening      Talked with Doreen from the [facility] who says [facility]. He is 100% connected and eligible for a contracted SNF. She was updated on the mental health concerns expressed by his family; he last saw a mental health provider via telehealth in May and will see a provider again in September. Regarding his poorly fitting prosthesis, she suggests calling the [facility] for a PT consu  "

## 2019-07-11 NOTE — PROCEDURES
DI- Nuclear Medicine V/Q Exam.    Patient complained of back discomfort and did not want to lay on imaging table.    Patient had difficulty keeping aerosal/vent tubing in mouth for ventilation of isotope.

## 2019-07-11 NOTE — PHARMACY
[facility]    Pharmacy      Antimicrobial Stewardship Note     Current antimicrobial therapy:  Anti-infectives (From now, onward)    Start     Dose/Rate Route Frequency Ordered Stop    07/10/19 1500  cefTRIAXone IN D5W (ROCEPHIN) intermittent infusion 2 g      2 g  100 mL/hr over 30 Minutes Intravenous EVERY 24 HOURS 19 1628      07/10/19 1330  azithromycin (ZITHROMAX) 250 mg in sodium chloride 0.9 % 250 mL intermittent infusion      250 mg  over 1 Hours Intravenous EVERY 24 HOURS 19 1628            Indication: CAP    Days of Therapy: 2     Pertinent labs:  Creatinine   Creatinine   Date Value Ref Range Status   2019 0.67 0.66 - 1.25 mg/dL Final   07/10/2019 0.72 0.66 - 1.25 mg/dL Final   2019 0.77 0.66 - 1.25 mg/dL Final     WBC   WBC   Date Value Ref Range Status   2019 14.0 (H) 4.0 - 11.0 10e9/L Final   07/10/2019 14.6 (H) 4.0 - 11.0 10e9/L Final   2019 18.9 (H) 4.0 - 11.0 10e9/L Final     Procalcitonin   Procalcitonin   Date Value Ref Range Status   2019 0.32 ng/ml Final     Comment:     0.25-0.49 ng/ml  Possible early systemic infection or localized infection.     Recommendation: Encourage antibiotics only in the correct clinical context.   Consider obtaining blood cultures or other relevant cultures. Recheck PCT in   6-12 hours to ensure baseline low level. If repeat PCT is rising, consider   early systemic infection and consider starting antibiotics.       CRP   CRP Inflammation   Date Value Ref Range Status   2013 2.5 0.0 - 3.0 mg/L Final     Comment:      reference ranges have not been established.  C-reactive protein   values   should be interpreted as a comparison of serial measurements.       Culture Results:   (19) Catheterized Urine = GPC (sensitivites pending)  (19) Blood and sputum = NGTD     Recommendations/Interventions:  1. None; positive urine culture likely contaminant    Edvin Marrero, [facility]  2019

## 2019-07-11 NOTE — PLAN OF CARE
Pt had a good night.  He is oriented to self and time this morning, but remains impulsive at times.  Pt remains tachycardic in ST. Pulses palpable.  No fevers this morning.  Pt is on RA with very course lung sounds throughout.  He is not coughing up secretions at this time and has a difficult time even clearing his throat.  He refused oral cares for me throughout my shift despite having a dry looking filmy mouth.  Pt is incontinent most of the time of both bowel and bladder.  His groin and buttocks are less excoriated than previously. Pt is getting D5@ 75 mL/hr.  Throughout the night he received a total of 3 mg of morphine for work of breathing as well as 2.5 mg of metoprolol for elevated Hr.  The patient is currently experiencing some urinary frequency now in the last hour.  Will continue to monitor.

## 2019-07-11 NOTE — PLAN OF CARE
Skin assessed head to toe with pts permission during monthly skin rounds. No pressure areas noted. Andressa FLOYD and Viktoriya FLOYD

## 2019-07-11 NOTE — PROGRESS NOTES
"CLINICAL NUTRITION SERVICES  -  ASSESSMENT NOTE    Tim Watson : Admission Nutrition Risk Screen - reduced intake    68 yom admitted for pneumonia. Pt has a hx of hypertension, hyperlipidemia, GERD, and above knee amputation. Noted pt has stopped eating typical foods and has only consuming Equate nutrition supplements for the most part. He does receive Meals on Wheels. Limited weight documented per chart review. Pt not available during attempt to visit.     Diet Order: Regular- NPO with BiPAP  Intake: Only chocolate ensure documented 2x. Breakfast and lunch were ordered- only intake documented today was 240ml Ensure.    Height: 5' 7\"  Weight: 146 lbs 9.69 oz  Body mass index is 22.96 kg/m .  Weight Status:  Within Normal IBWR  IBWR: 111-148lb- for AKA  Weight History:  141lb - 8/6/15    Estimated Energy Needs: 4449-2042 kcals (30-35 Kcal/Kg)   Estimated Protein Needs:  grams protein (1.2-1.5 g pro/Kg)    Malnutrition Diagnosis: Unable to determine- could not meet with pt    NUTRITION RECOMMENDATIONS  - Ensure Enlive at least 3x per day. 6 Ensure Enlive will meet pt's nutrition needs (2100kcal and 120g protein)    MONITORING AND EVALUATION:  RD will monitor intake, weight, labs. Will follow up.        "

## 2019-07-11 NOTE — PLAN OF CARE
"Pt fluids stopped as pt has become increasingly congested sounding. Lungs course. Continues with low grade temps. Continues on rocephin and zithromax. Pt sat in chair after lots of encouragement. A/2 with walker and gait belt. Pt does not wish to use prosthetic at this time. Pt states \"that prosthetic is garbage\". Pt sat in chair for short while this afternoon and then started throwing swear words at staff. So this staff member just put pt back to bed after half hour. Pt has no appetite but will drink ensures. Pt I guess drinks 15/day or so as told by sisters. Pt wanted sleeping pill all day but denied morning meds. When told this is what you take at home pt said sure and then refused once staff came back with the pills. Spoke with tele ICU today. Explained situation of the stopping of fluids. MD did not want lasix at this time. Pt has had multiple occurrences of incontinence. Pt cant go to BR byself so staff assists pt with urinal and/or up to BSC. This happened numerous occurances. Pt starting to get impulsive and attempting to get out of bed byself. Criteria explained for discharge and goals that need to happen and pt appears extremely frustrated. Sisters present throughout shift.     Face to face report given with opportunity to observe patient.    Report given to Elodia MORALES   7/10/2019  7:40 PM    "

## 2019-07-11 NOTE — PROVIDER NOTIFICATION
3126:FYI to Dr. Lino  , RR 35, O2 91% on RA /81, Pt diaphoretic. T 99.7, lung sounds course throughout audibly wheezy. Increased work of breathing.  RT says similar to on admit.  Will continue to monitor.

## 2019-07-12 ENCOUNTER — APPOINTMENT (OUTPATIENT)
Dept: ULTRASOUND IMAGING | Facility: HOSPITAL | Age: 69
DRG: 871 | End: 2019-07-12
Attending: INTERNAL MEDICINE
Payer: COMMERCIAL

## 2019-07-12 ENCOUNTER — APPOINTMENT (OUTPATIENT)
Dept: OCCUPATIONAL THERAPY | Facility: HOSPITAL | Age: 69
DRG: 871 | End: 2019-07-12
Attending: INTERNAL MEDICINE
Payer: COMMERCIAL

## 2019-07-12 ENCOUNTER — APPOINTMENT (OUTPATIENT)
Dept: PHYSICAL THERAPY | Facility: HOSPITAL | Age: 69
DRG: 871 | End: 2019-07-12
Payer: COMMERCIAL

## 2019-07-12 ENCOUNTER — APPOINTMENT (OUTPATIENT)
Dept: SPEECH THERAPY | Facility: HOSPITAL | Age: 69
DRG: 871 | End: 2019-07-12
Attending: INTERNAL MEDICINE
Payer: COMMERCIAL

## 2019-07-12 LAB
ANION GAP SERPL CALCULATED.3IONS-SCNC: 4 MMOL/L (ref 3–14)
BASOPHILS # BLD AUTO: 0 10E9/L (ref 0–0.2)
BASOPHILS NFR BLD AUTO: 0.3 %
BUN SERPL-MCNC: 27 MG/DL (ref 7–30)
CALCIUM SERPL-MCNC: 8.3 MG/DL (ref 8.5–10.1)
CHLORIDE SERPL-SCNC: 112 MMOL/L (ref 94–109)
CO2 SERPL-SCNC: 28 MMOL/L (ref 20–32)
CREAT SERPL-MCNC: 0.74 MG/DL (ref 0.66–1.25)
DIFFERENTIAL METHOD BLD: ABNORMAL
EOSINOPHIL # BLD AUTO: 0.5 10E9/L (ref 0–0.7)
EOSINOPHIL NFR BLD AUTO: 4.4 %
ERYTHROCYTE [DISTWIDTH] IN BLOOD BY AUTOMATED COUNT: 15.2 % (ref 10–15)
GFR SERPL CREATININE-BSD FRML MDRD: >90 ML/MIN/{1.73_M2}
GLUCOSE SERPL-MCNC: 118 MG/DL (ref 70–99)
HCT VFR BLD AUTO: 39 % (ref 40–53)
HGB BLD-MCNC: 13 G/DL (ref 13.3–17.7)
IMM GRANULOCYTES # BLD: 0.1 10E9/L (ref 0–0.4)
IMM GRANULOCYTES NFR BLD: 0.6 %
LACTATE BLD-SCNC: 1.3 MMOL/L (ref 0.7–2)
LYMPHOCYTES # BLD AUTO: 2 10E9/L (ref 0.8–5.3)
LYMPHOCYTES NFR BLD AUTO: 19 %
MCH RBC QN AUTO: 29.8 PG (ref 26.5–33)
MCHC RBC AUTO-ENTMCNC: 33.3 G/DL (ref 31.5–36.5)
MCV RBC AUTO: 89 FL (ref 78–100)
MONOCYTES # BLD AUTO: 0.9 10E9/L (ref 0–1.3)
MONOCYTES NFR BLD AUTO: 8.4 %
NEUTROPHILS # BLD AUTO: 6.9 10E9/L (ref 1.6–8.3)
NEUTROPHILS NFR BLD AUTO: 67.3 %
NRBC # BLD AUTO: 0 10*3/UL
NRBC BLD AUTO-RTO: 0 /100
PLATELET # BLD AUTO: 257 10E9/L (ref 150–450)
POTASSIUM SERPL-SCNC: 3.7 MMOL/L (ref 3.4–5.3)
RBC # BLD AUTO: 4.36 10E12/L (ref 4.4–5.9)
SODIUM SERPL-SCNC: 144 MMOL/L (ref 133–144)
WBC # BLD AUTO: 10.3 10E9/L (ref 4–11)

## 2019-07-12 PROCEDURE — 80048 BASIC METABOLIC PNL TOTAL CA: CPT | Performed by: INTERNAL MEDICINE

## 2019-07-12 PROCEDURE — 85025 COMPLETE CBC W/AUTO DIFF WBC: CPT | Performed by: INTERNAL MEDICINE

## 2019-07-12 PROCEDURE — 92610 EVALUATE SWALLOWING FUNCTION: CPT | Mod: GN

## 2019-07-12 PROCEDURE — 97162 PT EVAL MOD COMPLEX 30 MIN: CPT | Mod: GP

## 2019-07-12 PROCEDURE — 25800030 ZZH RX IP 258 OP 636: Performed by: INTERNAL MEDICINE

## 2019-07-12 PROCEDURE — 25000128 H RX IP 250 OP 636: Performed by: INTERNAL MEDICINE

## 2019-07-12 PROCEDURE — 83605 ASSAY OF LACTIC ACID: CPT | Performed by: INTERNAL MEDICINE

## 2019-07-12 PROCEDURE — 25000132 ZZH RX MED GY IP 250 OP 250 PS 637: Performed by: INTERNAL MEDICINE

## 2019-07-12 PROCEDURE — 20000003 ZZH R&B ICU

## 2019-07-12 PROCEDURE — 97166 OT EVAL MOD COMPLEX 45 MIN: CPT | Mod: GO

## 2019-07-12 PROCEDURE — 25000125 ZZHC RX 250: Performed by: INTERNAL MEDICINE

## 2019-07-12 PROCEDURE — 99233 SBSQ HOSP IP/OBS HIGH 50: CPT | Performed by: INTERNAL MEDICINE

## 2019-07-12 PROCEDURE — 36415 COLL VENOUS BLD VENIPUNCTURE: CPT | Performed by: INTERNAL MEDICINE

## 2019-07-12 PROCEDURE — 93970 EXTREMITY STUDY: CPT | Mod: TC

## 2019-07-12 RX ADMIN — AZITHROMYCIN MONOHYDRATE 250 MG: 500 INJECTION, POWDER, LYOPHILIZED, FOR SOLUTION INTRAVENOUS at 13:56

## 2019-07-12 RX ADMIN — TAMSULOSIN HYDROCHLORIDE 0.4 MG: 0.4 CAPSULE ORAL at 08:33

## 2019-07-12 RX ADMIN — MIRTAZAPINE 45 MG: 30 TABLET, FILM COATED ORAL at 22:10

## 2019-07-12 RX ADMIN — ARIPIPRAZOLE 5 MG: 5 TABLET ORAL at 08:33

## 2019-07-12 RX ADMIN — BUPROPION HYDROCHLORIDE 150 MG: 150 TABLET, FILM COATED, EXTENDED RELEASE ORAL at 08:33

## 2019-07-12 RX ADMIN — ENOXAPARIN SODIUM 40 MG: 40 INJECTION SUBCUTANEOUS at 15:50

## 2019-07-12 RX ADMIN — GABAPENTIN 300 MG: 300 CAPSULE ORAL at 08:33

## 2019-07-12 RX ADMIN — CEFTRIAXONE SODIUM 2 G: 2 INJECTION, SOLUTION INTRAVENOUS at 15:49

## 2019-07-12 RX ADMIN — DILTIAZEM HYDROCHLORIDE 15 MG/HR: 5 INJECTION INTRAVENOUS at 05:08

## 2019-07-12 RX ADMIN — NICOTINE 1 PATCH: 21 PATCH, EXTENDED RELEASE TRANSDERMAL at 08:33

## 2019-07-12 ASSESSMENT — ACTIVITIES OF DAILY LIVING (ADL)
ADLS_ACUITY_SCORE: 22
ADLS_ACUITY_SCORE: 23

## 2019-07-12 ASSESSMENT — MIFFLIN-ST. JEOR: SCORE: 1403.63

## 2019-07-12 NOTE — PLAN OF CARE
Face to face report given with opportunity to observe patient.    Report given to MARIEL Jose   7/12/2019  7:24 AM

## 2019-07-12 NOTE — PLAN OF CARE
Pt HR 's, per Dr. Goldstein will stop the Cardizem gtt. This writer also updated Dr. Goldstein on concerns regarding pt need for swallow eval. Will continue to monitor closely.

## 2019-07-12 NOTE — PROGRESS NOTES
07/12/19 1044   General Information   Onset Date 03/12/19   Start of Care Date 07/12/19   Referring Physician Dr. Vasquez   Patient/Family Goals Statement Patient unable to verbalize a goal. When asked what he wants to eat he states ensure. RN reports that family states over the last several months patient has been refusing to eat solid foods and only wants to drink ensure. Patient will occasionally eat mashed potatoes.    Swallowing Evaluation Bedside swallow evaluation   Behaviorial Observations Confused;Distractible;Impulsive   Mode of current nutrition Oral diet   Type of oral diet Regular;Thin liquid   Respiratory Status Room air   Clinical Swallow Evaluation   Oral Musculature generally intact   Structural Abnormalities none present   Dentition edentulous, does not have dentures   Mucosal Quality good   Mandibular Strength and Mobility intact   Oral Labial Strength and Mobility impaired retraction;impaired coordination   Lingual Strength and Mobility impaired protrusion;impaired coordination   Velar Elevation intact   Buccal Strength and Mobility intact   Laryngeal Function Cough;Throat clear   Additional Documentation Yes   Clinical Swallow Eval: Thin Liquid Texture Trial   Mode of Presentation, Thin Liquids cup   Volume of Liquid or Food Presented 4 oz   Oral Phase of Swallow Premature pharyngeal entry   Pharyngeal Phase of Swallow coughing/choking;throat clearing;wet vocal quality after swallow   Diagnostic Statement Patient coughed immediately after chugging 4 oz of thin liquids. Patient demonstrates a lot of oral control issues which can lead to aspiration.    Clinical Swallow Eval: Nectar Thick Liquid Texture Trial   Mode of Presentation, Nectar cup   Volume of Nectar Presented 4 oz   Oral Phase, Emet WFL   Pharyngeal Phase, Emet intact   Diagnostic Statement Patient still chugged the nectar thick liquids but due to increased viscosity he was able to handle it better and no cough indicated.     Swallow Compensations   Swallow Compensations Alternate viscosity of consistencies   General Therapy Interventions   Planned Therapy Interventions Dysphagia Treatment   Dysphagia treatment Oropharyngeal exercise training;Modified diet education;Instruction of safe swallow strategies;Compensatory strategies for swallowing   Swallow Eval: Clinical Impressions   Skilled Criteria for Therapy Intervention Skilled criteria met.  Treatment indicated.   Dysphagia Outcome Severity Scale (EDELMIRA) Level 4 - EDELMIRA   Treatment Diagnosis Oral Pharyngeal dysphagia   Diet texture recommendations Nectar thick liquids;Dysphagia diet level 1   Recommended Feeding/Eating Techniques alternate between small bites and sips of food/liquid;check mouth frequently for oral residue/pocketing;hard swallow w/ each bite or sip;maintain upright posture during/after eating for 30 mins;no straws;small sips/bites   Demonstrates Need for Referral to Another Service social work;respiratory therapy;dietitian;occupational therapy;physical therapy   Therapy Frequency 3x/week   Predicted Duration of Therapy Intervention (days/wks) 2 weeks   Anticipated Discharge Disposition extended care facility   Risks and Benefits of Treatment have been explained. Yes   Patient, family and/or staff in agreement with Plan of Care Yes   Clinical Impression Comments Patient demonstrates overall oral incoordination which can lead to premature pharyngeal entry and aspiration. SLP recommends nectar thick liquids with trials of pureed if patient is cooperative. During evaluation patient did not trials solids as he refused.    Total Evaluation Time   Total Evaluation Time (Minutes) 25

## 2019-07-12 NOTE — PLAN OF CARE
Discharge Planner PT   Patient plan for discharge: TBD  Current status: Bed Mobility: Independent   Transfers: Min A x2 with FWW for stand pivot transfer to recliner. Pt fatigued quickly during functional mobility   Gait: Unable   Barriers to return to prior living situation: Needs assist x2 for transfers, weakness, high fall risk   Recommendations for discharge: TCU   Rationale for recommendations: PT evaluation ordered for weakness. Pt is currently unable to transfer himself without min A x2 and would not be safe to discharge home alone. Due to LLE weakness and AKA RLE,  pt is a very high fall risk. Recommend discharge to TCU for  short term rehab. Pt was agreeable to this plan during session today. Plan to treat pt during his stay       Entered by: Windy Fitch 07/12/2019 12:59 PM
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
## 2019-07-12 NOTE — PLAN OF CARE
Discharge Planner SLP   Patient plan for discharge: Nectar thick liquids with encouraging of pureed textures if patient will eat them. Recommended mighty shakes, ensure, and trials of preferred pureeds if patient is responsive.  Current status: Nectar thick liquids with pureed textures.   Barriers to return to prior living situation: None known.   Recommendations for discharge: Extended care facility   Rationale for recommendations: Patient demonstrates oral weakness and incoordination which can lead to increased risk for aspiration.        Entered by: Yadira Bowen 07/12/2019 10:52 AM

## 2019-07-12 NOTE — PLAN OF CARE
Pt remains confused and oriented to self.  His HR is 98 on 15 mL/hr of Cardizem IV infusion.  He is in ST.  Pulses are palpable. SBP is 90's-100's. RR 30's with some SOB and abdominal muscle use. Pt is 93% on room air but very course with an expiratory wheeze. Pt has a weak cough.  Pt is incontinent at times of both bowel and bladder. He is experiencing some diarrhea throughout the night.  Stools are soft and brown.  Pt continues to have an excoriated groin and buttocks, for which he is getting barrier ointment.  He has two patent Iv's.  Pt has been noted to be talking to himself this morning. See results review for labs.  Will continue to monitor.

## 2019-07-12 NOTE — PROGRESS NOTES
Inpatient Physical Therapy Evaluation    Name: Tim Watson MRN# [id number]   Age: 68 year old YOB: 1950     Date of Consultation: 2019  Consultation is requested by:  Dr. Goldstein  Specific Consultation Request:  Weakness  Primary care provider: No primary care provider on file.    General Information:   Onset Date: 19    History of Current Problem/Admission: Pneumonia of left lower lobe due to infectious organism (H) [J18.1]  Congestive heart failure, unspecified HF chronicity, unspecified heart failure type (H) [I50.9]    Prior Level of Function: Pt's sister present to help provide details regarding PLOF. Pt reports he lives at home alone. He gets meals on wheels and has a house keeper. He uses wheelchair for mobility and was able to transfer himself in and out of chair independently.   Ambulation: 4 - Completely Dependent   Transferrin - Independent   Toiletin - Independent    Bathin - Not assessed  Dressin - Not assessed  Eatin - Not assessed   Cognition: 1 - attention or memory deficits    Fall history within the last 6 months: Yes, found on floor at home     Current Living Situation:Patient lives at home alone. Gets meals on wheels and has a . Sister reports she is concerned that he has been declining lately     Current Equipment Used at Home: Wheelchair, looking into getting a powerchair. Pt has a prosthesis but does not use it     Patient & Family Goals: Agreeable to placement if necessary     Past Medical History:   No past medical history on file.    Past Surgical History:  No past surgical history on file.    Medications:   Current Facility-Administered Medications   Medication     ARIPiprazole (ABILIFY) tablet 5 mg     azithromycin (ZITHROMAX) 250 mg in sodium chloride 0.9 % 250 mL intermittent infusion     buPROPion (WELLBUTRIN SR) 12 hr tablet 150 mg     cefTRIAXone IN D5W (ROCEPHIN) intermittent infusion 2 g     diltiazem (CARDIZEM) 125 mg  in sodium chloride 0.9 % 125 mL infusion     enoxaparin (LOVENOX) injection 40 mg     gabapentin (NEURONTIN) capsule 300 mg     lidocaine (LMX4) kit     lidocaine 1 % 0.1-1 mL     melatonin tablet 1 mg     mirtazapine (REMERON) tablet 45 mg     morphine (PF) injection 1 mg     naloxone (NARCAN) injection 0.1-0.4 mg     nicotine (NICODERM CQ) 21 MG/24HR 24 hr patch 1 patch     nicotine Patch in Place     nicotine patch REMOVAL     ondansetron (ZOFRAN-ODT) ODT tab 4 mg    Or     ondansetron (ZOFRAN) injection 4 mg     sodium chloride (PF) 0.9% PF flush 3 mL     sodium chloride (PF) 0.9% PF flush 3 mL     tamsulosin (FLOMAX) capsule 0.4 mg       Weight Bearing Status: WBAT     Cognitive Status Examination:  Orientation: awake and alert  Level of Consciousness: alert  Follows Commands and Answers Questions: 75% of the time and able to follow single step commands  Personal Safety and Judgement: At Risk Behaviors Demonstrated  Memory: Immediate recall intact  Comments: Pt difficult to understand due to garbled speech. Sister reports she has tried to get him new dentures but he won't wear them     Pain:   Currently in pain? No  Pain Location? NA  Pain Rating:     Physical Therapy Evaluation:   Integumentary/Edema: No issues observed  ROM: WFL LLE   Strength: LLE weakness demonstrated. Grossly 3+ to 4-/5. Residual limb strength not assessed   Bed Mobility: Independent   Transfers: Min A x2 with FWW for stand pivot transfer to recliner. Pt fatigued quickly during functional mobility   Gait: Unable   Stairs: NA  Balance: Impaired due to having to balance on LLE only   Sensory: NA  Coordination: No issues observed     Physical Therapy Interventions: Balance, Transfers, Strengthening, ROM    Clinical Impressions:  Criteria for Skilled Therapeutic Intervention Met: Yes, treatment indicated  PT Diagnosis: Weakness  Influenced by the following impairments: LE weakness, decreased activity tolerance, high fall risk   Functional  limitations due to impairment: Requires assistance x2 for transfers, weakness, increase risk for falls   Clinical presentation: Evolving/Changing  Clinical presentation rationale: Clinical judgement  Clinical Decision making (complexity): Moderate Complexity  Frequency: 6 times/week  Predicted Duration of Therapy Intervention (days/wks): 5 days    Anticipated Discharge Disposition: Transitional Care Facility  and Acute Rehab Facility   Anticipated Equipment Needs at Discharge: NA  Risks and Benefits of therapy have been explained: Yes  Patient, Family & other staff in agreement with plan of care: Yes  Clinical Impression Comments: PT evaluation ordered for weakness. Pt is currently unable to transfer himself without min A x2 and would not be safe to discharge home alone. Due to LLE weakness and AKA RLE,  pt is a very high fall risk. Recommend discharge to TCU for  short term rehab. Pt was agreeable to this plan during session today. Plan to treat pt during his stay.     Total Eval Time: 15

## 2019-07-12 NOTE — PLAN OF CARE
Pt is alert, disoriented to time/situation. Maintaining sats on RA. Cardizem gtt stopped this AM, HR has been -110's. BP's stable and pt has been afebrile. Lungs are coarse with frequent nonproductive cough. Pt breathing becomes labored with any activity. Pt witnessed to have difficulty swallowing this AM, swallow eval ordered. Pt then put on pureed/nectar thickened liquid. Pt continues to refuse anything but ensure, though did drink two apple juices today and took several bites of cream of wheat. Pt incontinent of bowel/bladder. ABX- IV rocephin and IV zithromax. Up in chair and to BSC with assist of 2 using GB/walker. Excoriation to buttocks/homar area improving, barrier cream applied t/o shift. Will continue to monitor.

## 2019-07-12 NOTE — PLAN OF CARE
Discharge Planner OT   Patient plan for discharge: TBD  Current status: SBA for bed mobility and doffing/donning socks. Pt refused to get out of bed for any other tasks. Pt became very upset.   Barriers to return to prior living situation: living alone, fall risk, cognition/behaviors, impaired activity tolerance, generalized weakness, and deconditioning   Recommendations for discharge: short term rehab  Rationale for recommendations: Pt refused to get out of bed with OT but was willing to sit EOB and complete LB dressing which he completed with SBA but moderate difficulties. Pt would not be safe to return home alone at this time to manage his ADLs and some IADLs he was managing prior to hospitalization. Recommend short term rehab to increase pt's safety and independence in ADLs for an eventual, safe return home. Plan to treat during pt's stay.        Entered by: Haley Lozoya 07/12/2019 2:01 PM

## 2019-07-12 NOTE — PHARMACY
[facility]    Pharmacy    Antimicrobial Stewardship Note     Current antimicrobial therapy:  Anti-infectives (From now, onward)    Start     Dose/Rate Route Frequency Ordered Stop    07/10/19 1500  cefTRIAXone IN D5W (ROCEPHIN) intermittent infusion 2 g      2 g  100 mL/hr over 30 Minutes Intravenous EVERY 24 HOURS 19 1628      07/10/19 1330  azithromycin (ZITHROMAX) 250 mg in sodium chloride 0.9 % 250 mL intermittent infusion      250 mg  over 1 Hours Intravenous EVERY 24 HOURS 19 1628          Indication: CAP (ceftriaxone and azithromycin), UTI (ceftriaxone)    Days of Therapy: 4     Pertinent labs:  Creatinine   Creatinine   Date Value Ref Range Status   2019 0.74 0.66 - 1.25 mg/dL Final   2019 0.67 0.66 - 1.25 mg/dL Final   07/10/2019 0.72 0.66 - 1.25 mg/dL Final     WBC   WBC   Date Value Ref Range Status   2019 10.3 4.0 - 11.0 10e9/L Final   2019 14.0 (H) 4.0 - 11.0 10e9/L Final   07/10/2019 14.6 (H) 4.0 - 11.0 10e9/L Final     Procalcitonin   Procalcitonin   Date Value Ref Range Status   2019 0.32 ng/ml Final     Comment:     0.25-0.49 ng/ml  Possible early systemic infection or localized infection.     Recommendation: Encourage antibiotics only in the correct clinical context.   Consider obtaining blood cultures or other relevant cultures. Recheck PCT in   6-12 hours to ensure baseline low level. If repeat PCT is rising, consider   early systemic infection and consider starting antibiotics.       CRP   CRP Inflammation   Date Value Ref Range Status   2013 2.5 0.0 - 3.0 mg/L Final     Comment:      reference ranges have not been established.  C-reactive protein   values   should be interpreted as a comparison of serial measurements.     Culture Results:   30-Day Micro Results     Procedure Component Value Units Date/Time   Active MRSA Surveillance Culture [[id number]] Collected: 19 9546   Order Status: Completed Lab Status: Final result  Updated: 07/11/19 0635   Specimen: Nares from Nose     Specimen Description Nares    Culture Micro No MRSA isolated   Urine Culture Aerobic Bacterial [[id number]] (Abnormal) Collected: 07/09/19 1448   Order Status: Completed Lab Status: Preliminary result Updated: 07/11/19 0905   Specimen: Catheterized Urine     Specimen Description Catheterized Urine    Culture Micro 10,000 to 50,000 colonies/mL   Gram positive cocci   Identification to follow.   Abnormal    Blood culture [[id number]] Collected: 07/09/19 1347   Order Status: Completed Lab Status: Preliminary result Updated: 07/12/19 0634   Specimen: Blood     Specimen Description Blood    Special Requests Left Arm    Culture Micro No growth after 3 days   Blood culture    Order Status: Canceled Lab Status: No result    Specimen: Blood    Blood culture    Order Status: Canceled Lab Status: No result    Specimen: Blood    Blood culture [[id number]] Collected: 07/09/19 1324   Order Status: Completed Lab Status: Preliminary result Updated: 07/12/19 0634   Specimen: Blood     Specimen Description Blood Left Arm    Culture Micro No growth after 3 days      Recommendations/Interventions:  1. No recommendations at this time. Will continue to monitor.     Halley Parry RPH  July 12, 2019

## 2019-07-12 NOTE — PROGRESS NOTES
Inpatient Occupational Therapy Evaluation    Name: Tim Watson MRN# [id number]   Age: 68 year old YOB: 1950     Date of Consultation: 2019  Consultation is requested by:  Dr. Goldstein  Specific Consultation Request:  Weakness  Primary care provider: No primary care provider on file.    General Information:   Onset Date: 19    History of Current Problem/Admission: Pneumonia of left lower lobe due to infectious organism (H) [J18.1]  Congestive heart failure, unspecified HF chronicity, unspecified heart failure type (H) [I50.9]    Prior Level of Function: Pt reports he was independent in ADLs prior to hospitalization. Pt uses a wheelchair for mobility but was able to complete stand pivot transfers independently.   Ambulation: 4 - Completely Dependent   Transferrin - Independent   Toiletin - Assistive Equipment    Bathin - Assistive Equipment   Dressin - Independent   Eatin - Independent   Communication: 2 - difficulty with understanding (not related to language barrier) and 2 - difficulity speaking (not related to language barrier)  Swallowin - difficulty swallowing liquids/foods  Cognition: 1 - attention or memory deficits    Fall history within the last 6 months: Yes    Current Living Situation: Pt lives at home alone in a split level home with a chair lift on the stairs. Kitchen is upstairs and the bedroom and bathroom are downstairs. Bathroom has a walk in shower with a shower chair and grab bars, and a regular height toilet with grab bars.     Current Equipment Used at Home: Wheelchair, shower chair, grab bars in bathroom. Pt has a prosthesis but doesn't use it      Patient & Family Goals: Pt did not verbalize. Family is open to short term rehab      Past Medical History:   No past medical history on file.    Past Surgical History:  No past surgical history on file.    Medications:   Current Facility-Administered Medications   Medication     ARIPiprazole  (ABILIFY) tablet 5 mg     azithromycin (ZITHROMAX) 250 mg in sodium chloride 0.9 % 250 mL intermittent infusion     buPROPion (WELLBUTRIN SR) 12 hr tablet 150 mg     cefTRIAXone IN D5W (ROCEPHIN) intermittent infusion 2 g     diltiazem (CARDIZEM) 125 mg in sodium chloride 0.9 % 125 mL infusion     enoxaparin (LOVENOX) injection 40 mg     gabapentin (NEURONTIN) capsule 300 mg     lidocaine (LMX4) kit     lidocaine 1 % 0.1-1 mL     melatonin tablet 1 mg     mirtazapine (REMERON) tablet 45 mg     morphine (PF) injection 1 mg     naloxone (NARCAN) injection 0.1-0.4 mg     nicotine (NICODERM CQ) 21 MG/24HR 24 hr patch 1 patch     nicotine Patch in Place     nicotine patch REMOVAL     ondansetron (ZOFRAN-ODT) ODT tab 4 mg    Or     ondansetron (ZOFRAN) injection 4 mg     sodium chloride (PF) 0.9% PF flush 3 mL     sodium chloride (PF) 0.9% PF flush 3 mL     tamsulosin (FLOMAX) capsule 0.4 mg       Weight Bearing Status: no restrictions      Cognitive Status Examination:  Orientation: awake and alert and oriented to time, place and person  Level of Consciousness: alert  Follows Commands and Answers Questions: 75% of the time  Personal Safety and Judgement: Impaired  Memory: Immediate recall intact  Comments: Pt difficult to understand due to garbled speech.     Pain:   Currently in pain? No  Pain Location?   Pain Ratin (No pain)    Occupational Therapy Evaluation:   Integumentary/Edema: not assessed   Range of Motion: BUE's WFL   Strength: BUE's grossly 4/5  Hand Strength: bilateral gross grasp good   Muscle Tone Assessment: no deficits   Coordination: no significant findings     Mobility:   Bed Mobility: Pt transferred supine to sit EOB with SBA.   Transfer Skills: pt refused to transfer from EOB; pt became greatly upset   Bed to Chair/Chair to Bed: refused   Toilet Transfer: refused to transfer out of bed to assess   Balance: good sitting balance, unable to assess standing balance      ADLs:   Lower Body Dressing:  SBA to doff/don socks with moderate difficulties   Grooming: pt declined washing up    IADLs:   Previous Responsibilities of the Patient: Laundry, Medication Management and Finances   Comments: pt gets meals on wheels. He has a . Pt's sisters complete the shopping. Pt hires out for yard work. Pt's sisters transport pt.     Activities of Daily Living Analysis:   Impairments Contributing to Impaired Activities of Daily Living: Cognition impaired, generalized weakness, deconditioning, behaviors   Comments:     Occupational Therapy Interventions: ADL Retraining , IADL retraining, balance retraining , cognition , ROM , strengthening , transfer training and progressive activity/exercise    Clinical Impressions:  Criteria for Skilled Therapeutic Intervention Met: Yes, treatment indicated  OT Diagnosis: impaired ADLs  Influenced by the following impairments: Cognition impaired, generalized weakness, deconditioning, behaviors   Functional limitations due to impairment: Toileting/transfers, bathing, functional mobility, dressing, grooming  Clinical presentation: Evolving/Changing  Clinical presentation rationale: clinical judgement   Clinical Decision making (complexity): Moderate Complexity  Frequency: 5 times/week  Predicted Duration of Therapy Intervention (days/wks): 5 days  Anticipated Discharge Disposition: Transitional Care Facility  and Acute Rehab Facility   Anticipated Equipment Needs at Discharge:   Risks and Benefits of therapy have been explained: Yes  Patient, Family & other staff in agreement with plan of care: Yes  Clinical Impression Comments: Pt refused to get out of bed with OT but was willing to sit EOB and complete LB dressing which he completed with SBA but moderate difficulties. Pt would not be safe to return home alone at this time to manage his ADLs and some IADLs he was managing prior to hospitalization. Recommend short term rehab to increase pt's safety and independence in ADLs for an  eventual, safe return home. Plan to treat during pt's stay.     Total Eval Time: 15

## 2019-07-13 ENCOUNTER — APPOINTMENT (OUTPATIENT)
Dept: PHYSICAL THERAPY | Facility: HOSPITAL | Age: 69
DRG: 871 | End: 2019-07-13
Payer: COMMERCIAL

## 2019-07-13 PROCEDURE — 25000128 H RX IP 250 OP 636: Performed by: INTERNAL MEDICINE

## 2019-07-13 PROCEDURE — 20000003 ZZH R&B ICU

## 2019-07-13 PROCEDURE — 25000132 ZZH RX MED GY IP 250 OP 250 PS 637: Performed by: INTERNAL MEDICINE

## 2019-07-13 PROCEDURE — 97110 THERAPEUTIC EXERCISES: CPT | Mod: GP

## 2019-07-13 PROCEDURE — 99233 SBSQ HOSP IP/OBS HIGH 50: CPT | Performed by: INTERNAL MEDICINE

## 2019-07-13 RX ORDER — CEFDINIR 300 MG/1
300 CAPSULE ORAL EVERY 12 HOURS SCHEDULED
Status: DISCONTINUED | OUTPATIENT
Start: 2019-07-13 | End: 2019-07-16 | Stop reason: HOSPADM

## 2019-07-13 RX ORDER — FUROSEMIDE 10 MG/ML
40 INJECTION INTRAMUSCULAR; INTRAVENOUS ONCE
Status: COMPLETED | OUTPATIENT
Start: 2019-07-13 | End: 2019-07-13

## 2019-07-13 RX ADMIN — CEFDINIR 300 MG: 300 CAPSULE ORAL at 20:48

## 2019-07-13 RX ADMIN — TAMSULOSIN HYDROCHLORIDE 0.4 MG: 0.4 CAPSULE ORAL at 09:10

## 2019-07-13 RX ADMIN — GABAPENTIN 300 MG: 300 CAPSULE ORAL at 09:10

## 2019-07-13 RX ADMIN — FUROSEMIDE 40 MG: 10 INJECTION, SOLUTION INTRAMUSCULAR; INTRAVENOUS at 09:17

## 2019-07-13 RX ADMIN — BUPROPION HYDROCHLORIDE 150 MG: 150 TABLET, FILM COATED, EXTENDED RELEASE ORAL at 20:46

## 2019-07-13 RX ADMIN — ARIPIPRAZOLE 5 MG: 5 TABLET ORAL at 09:10

## 2019-07-13 RX ADMIN — MIRTAZAPINE 45 MG: 30 TABLET, FILM COATED ORAL at 20:46

## 2019-07-13 RX ADMIN — BUPROPION HYDROCHLORIDE 150 MG: 150 TABLET, FILM COATED, EXTENDED RELEASE ORAL at 09:10

## 2019-07-13 RX ADMIN — NICOTINE 1 PATCH: 21 PATCH, EXTENDED RELEASE TRANSDERMAL at 09:23

## 2019-07-13 RX ADMIN — CEFDINIR 300 MG: 300 CAPSULE ORAL at 09:32

## 2019-07-13 ASSESSMENT — MIFFLIN-ST. JEOR: SCORE: 1404.63

## 2019-07-13 ASSESSMENT — ACTIVITIES OF DAILY LIVING (ADL)
ADLS_ACUITY_SCORE: 22

## 2019-07-13 NOTE — PROGRESS NOTES
GAIT/AMBULATION: Pivot transfer with FWW req mod asst 1. Bed mobility is indep with use or railings. Did not attempt ambulation this date.   EQUIPMENT NEEDS AT DISCHARGE: to be determined.  D/C RECOMMENDATIONS: SNF/acute rehab facility would be appropriate for further rehab to improve indep and safety prior to returning home.   COMMENTS: pt was cooperative during PT treatment this date. He did refuse exercises but agreed to working on standing /transfers.

## 2019-07-13 NOTE — PHARMACY
[facility]    Pharmacy      Antimicrobial Stewardship Note     Current antimicrobial therapy:  Anti-infectives (From now, onward)    Start     Dose/Rate Route Frequency Ordered Stop    19 0900  cefdinir (OMNICEF) capsule 300 mg      300 mg Oral EVERY 12 HOURS SCHEDULED 19 0848            Indication: CAP, UTI    Days of Therapy: day 1 of cefdinir (had 4 days of azithromycin and ceftriaxone)     Pertinent labs:  Creatinine   Creatinine   Date Value Ref Range Status   2019 0.74 0.66 - 1.25 mg/dL Final   2019 0.67 0.66 - 1.25 mg/dL Final   07/10/2019 0.72 0.66 - 1.25 mg/dL Final     WBC   WBC   Date Value Ref Range Status   2019 10.3 4.0 - 11.0 10e9/L Final   2019 14.0 (H) 4.0 - 11.0 10e9/L Final   07/10/2019 14.6 (H) 4.0 - 11.0 10e9/L Final     Procalcitonin   Procalcitonin   Date Value Ref Range Status   2019 0.32 ng/ml Final     Comment:     0.25-0.49 ng/ml  Possible early systemic infection or localized infection.     Recommendation: Encourage antibiotics only in the correct clinical context.   Consider obtaining blood cultures or other relevant cultures. Recheck PCT in   6-12 hours to ensure baseline low level. If repeat PCT is rising, consider   early systemic infection and consider starting antibiotics.       CRP   CRP Inflammation   Date Value Ref Range Status   2013 2.5 0.0 - 3.0 mg/L Final     Comment:      reference ranges have not been established.  C-reactive protein   values   should be interpreted as a comparison of serial measurements.       Culture Results: 7-Day Micro Results     Procedure Component Value Units Date/Time   Active MRSA Surveillance Culture [[id number]] Collected: 19 1755   Order Status: Completed Lab Status: Final result Updated: 19 0635   Specimen: Nares from Nose     Specimen Description Nares    Culture Micro No MRSA isolated   Urine Culture Aerobic Bacterial [[id number]] (Abnormal) Collected: 19 8366    Order Status: Completed Lab Status: Preliminary result Updated: 07/13/19 0805   Specimen: Catheterized Urine     Specimen Description Catheterized Urine    Culture Micro 10,000 to 50,000 colonies/mL   Alpha hemolytic Streptococcus   Identification to follow.   Abnormal    Blood culture [[id number]] Collected: 07/09/19 1347   Order Status: Completed Lab Status: Preliminary result Updated: 07/13/19 0620   Specimen: Blood     Specimen Description Blood    Special Requests Left Arm    Culture Micro No growth after 4 days   Blood culture    Order Status: Canceled Lab Status: No result    Specimen: Blood    Blood culture    Order Status: Canceled Lab Status: No result    Specimen: Blood    Blood culture [[id number]] Collected: 07/09/19 1324   Order Status: Completed Lab Status: Preliminary result Updated: 07/13/19 0620   Specimen: Blood     Specimen Description Blood Left Arm    Culture Micro No growth after 4 days   Sputum Culture Aerobic Bacterial [[id number]] Collected: 07/09/19 1302   Order Status: Canceled Lab Status: No result    Specimen: Sputum           Recommendations/Interventions:  1. None at this time.    Abbey Penn, [facility]  July 13, 2019

## 2019-07-13 NOTE — PLAN OF CARE
Pt has been confused, irritable  and occasionally has inappropriate angry outburst of yelling and swearing. He has also been confused and forgetful. He does calm down when left alone and seems to forget he is mad and can then be redirected. He refused his am labs. He did get  up for his standing weight. At this time         he was able to cough and expectorate a lot of phlegm. His lungs remain coarse , diminished with scattered I/WH and CK > on the left. He did drink 3 cartons of thickened orange juice, one with his crushed med's and did quite well. He did obsess about eating breakfast but didn't  understand the kitchen was closed. He refused any floor snacks. He has  slept in between cares. Will continue plan of care. He remains on room air with saO2's > 92%.VSS. Will continue plan of care.

## 2019-07-13 NOTE — PLAN OF CARE
Face to face report given with opportunity to observe patient.    Report given to Clau Osorio   7/13/2019  7:11 AM

## 2019-07-13 NOTE — PLAN OF CARE
Pt lungs better this afternoon. Few crackles with wheezing during activity. Received lasix this Am with good amt of output. Changed over to oral omnicef. Pt had a period of diarrhea this morning but has not stooled for about 6 hours. Afebrile. Pt refused labs this AM. Sat in chair for short amt of time and then persisted in getting himself back to bed without calling for help. Alarms were on and activated. Pt calls for urinal now. Slept hard this afternoon and was incontinent. Pt took pills in OJ this am but only drank half. Drinking ensures. Attempted food but pt does not like pureed. Denies pain. Denies nausea. Denies SOB. Congested cough. Sister went home today to [place] but will be back on Monday.    Face to face report given with opportunity to observe patient.    Report given to wilmar MORALES   7/13/2019  7:11 PM

## 2019-07-13 NOTE — PLAN OF CARE
Face to face report given with opportunity to observe patient.    Report given to Fatoumata Dunlap   7/12/2019  7:04 PM
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
## 2019-07-14 ENCOUNTER — APPOINTMENT (OUTPATIENT)
Dept: CT IMAGING | Facility: HOSPITAL | Age: 69
DRG: 871 | End: 2019-07-14
Attending: INTERNAL MEDICINE
Payer: COMMERCIAL

## 2019-07-14 LAB
ANION GAP SERPL CALCULATED.3IONS-SCNC: 5 MMOL/L (ref 3–14)
BASOPHILS # BLD AUTO: 0.1 10E9/L (ref 0–0.2)
BASOPHILS NFR BLD AUTO: 0.5 %
BUN SERPL-MCNC: 26 MG/DL (ref 7–30)
CALCIUM SERPL-MCNC: 9.4 MG/DL (ref 8.5–10.1)
CHLORIDE SERPL-SCNC: 105 MMOL/L (ref 94–109)
CO2 SERPL-SCNC: 30 MMOL/L (ref 20–32)
CREAT SERPL-MCNC: 0.76 MG/DL (ref 0.66–1.25)
DIFFERENTIAL METHOD BLD: ABNORMAL
EOSINOPHIL # BLD AUTO: 0.6 10E9/L (ref 0–0.7)
EOSINOPHIL NFR BLD AUTO: 5.8 %
ERYTHROCYTE [DISTWIDTH] IN BLOOD BY AUTOMATED COUNT: 15.1 % (ref 10–15)
GFR SERPL CREATININE-BSD FRML MDRD: >90 ML/MIN/{1.73_M2}
GLUCOSE SERPL-MCNC: 144 MG/DL (ref 70–99)
HCT VFR BLD AUTO: 43.9 % (ref 40–53)
HGB BLD-MCNC: 14.6 G/DL (ref 13.3–17.7)
IMM GRANULOCYTES # BLD: 0.1 10E9/L (ref 0–0.4)
IMM GRANULOCYTES NFR BLD: 0.8 %
LYMPHOCYTES # BLD AUTO: 1.5 10E9/L (ref 0.8–5.3)
LYMPHOCYTES NFR BLD AUTO: 16 %
MCH RBC QN AUTO: 29.7 PG (ref 26.5–33)
MCHC RBC AUTO-ENTMCNC: 33.3 G/DL (ref 31.5–36.5)
MCV RBC AUTO: 89 FL (ref 78–100)
MONOCYTES # BLD AUTO: 0.8 10E9/L (ref 0–1.3)
MONOCYTES NFR BLD AUTO: 8.3 %
NEUTROPHILS # BLD AUTO: 6.6 10E9/L (ref 1.6–8.3)
NEUTROPHILS NFR BLD AUTO: 68.6 %
NRBC # BLD AUTO: 0 10*3/UL
NRBC BLD AUTO-RTO: 0 /100
PLATELET # BLD AUTO: 328 10E9/L (ref 150–450)
POTASSIUM SERPL-SCNC: 4.1 MMOL/L (ref 3.4–5.3)
RBC # BLD AUTO: 4.91 10E12/L (ref 4.4–5.9)
SODIUM SERPL-SCNC: 140 MMOL/L (ref 133–144)
WBC # BLD AUTO: 9.6 10E9/L (ref 4–11)

## 2019-07-14 PROCEDURE — 80048 BASIC METABOLIC PNL TOTAL CA: CPT | Performed by: INTERNAL MEDICINE

## 2019-07-14 PROCEDURE — 70450 CT HEAD/BRAIN W/O DYE: CPT | Mod: TC

## 2019-07-14 PROCEDURE — 25000132 ZZH RX MED GY IP 250 OP 250 PS 637: Performed by: INTERNAL MEDICINE

## 2019-07-14 PROCEDURE — 25000128 H RX IP 250 OP 636: Performed by: INTERNAL MEDICINE

## 2019-07-14 PROCEDURE — 85025 COMPLETE CBC W/AUTO DIFF WBC: CPT | Performed by: INTERNAL MEDICINE

## 2019-07-14 PROCEDURE — 99232 SBSQ HOSP IP/OBS MODERATE 35: CPT | Performed by: INTERNAL MEDICINE

## 2019-07-14 PROCEDURE — 12000011 ZZH R&B MS OVERFLOW

## 2019-07-14 PROCEDURE — 36415 COLL VENOUS BLD VENIPUNCTURE: CPT | Performed by: INTERNAL MEDICINE

## 2019-07-14 RX ORDER — FUROSEMIDE 10 MG/ML
20 INJECTION INTRAMUSCULAR; INTRAVENOUS ONCE
Status: COMPLETED | OUTPATIENT
Start: 2019-07-14 | End: 2019-07-14

## 2019-07-14 RX ADMIN — TAMSULOSIN HYDROCHLORIDE 0.4 MG: 0.4 CAPSULE ORAL at 09:01

## 2019-07-14 RX ADMIN — MIRTAZAPINE 45 MG: 30 TABLET, FILM COATED ORAL at 20:42

## 2019-07-14 RX ADMIN — BUPROPION HYDROCHLORIDE 150 MG: 150 TABLET, FILM COATED, EXTENDED RELEASE ORAL at 09:01

## 2019-07-14 RX ADMIN — BUPROPION HYDROCHLORIDE 150 MG: 150 TABLET, FILM COATED, EXTENDED RELEASE ORAL at 20:44

## 2019-07-14 RX ADMIN — FUROSEMIDE 20 MG: 10 INJECTION, SOLUTION INTRAMUSCULAR; INTRAVENOUS at 12:16

## 2019-07-14 RX ADMIN — CEFDINIR 300 MG: 300 CAPSULE ORAL at 20:43

## 2019-07-14 RX ADMIN — CEFDINIR 300 MG: 300 CAPSULE ORAL at 09:01

## 2019-07-14 RX ADMIN — GABAPENTIN 300 MG: 300 CAPSULE ORAL at 09:01

## 2019-07-14 RX ADMIN — ARIPIPRAZOLE 5 MG: 5 TABLET ORAL at 09:02

## 2019-07-14 RX ADMIN — NICOTINE 1 PATCH: 21 PATCH, EXTENDED RELEASE TRANSDERMAL at 09:02

## 2019-07-14 ASSESSMENT — ACTIVITIES OF DAILY LIVING (ADL)
ADLS_ACUITY_SCORE: 22
ADLS_ACUITY_SCORE: 21
ADLS_ACUITY_SCORE: 22

## 2019-07-14 ASSESSMENT — MIFFLIN-ST. JEOR: SCORE: 1388.63
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
## 2019-07-14 NOTE — PLAN OF CARE
Pt had a good night.. He was cooperative with all cares. Drinking Nectar thickened liquids without difficulty. He was afebrile and VSS. He agreed to shower and slept well.  BBS remain dim. He is independently coughing  and is able to follow simple commands and  make his needs known.  Face to face report given with opportunity to observe patient.    Report given to Clau Osorio   7/14/2019  7:22 AM

## 2019-07-14 NOTE — PLAN OF CARE
Pt has been more cooperative today. Still refusing acitivity. Turn in bed well without help. Sits at side of bed when drinking fluids. Coughing noticed still with thick liquids, instructed to tuck chin in. VSS. Lungs dim to course at times. Received IV lasix with incontinence afterwards. Pt did agree to go down for head CT. Awaiting those results. Drinking juice and ensure. Had about 3 ensures today. No stools. Pt at times wakes up and needs to be reoriented, swearing at staff when wants to be left alone. After explanations pt usually is cooperative. Family here today. Alarms active on bed. Continues on oral omnicef. Tooks pills today crushed in a chocolate ensure without difficulty.    Face to face report given with opportunity to observe patient.    Report given to Fatoumata MORALES   7/14/2019  7:05 PM

## 2019-07-14 NOTE — PROGRESS NOTES
Reviewed Medicare IM letter with Tim this morning. He had no questions about it.     We talked about discharging for STR at a SNF; he is agreeable to this as well. He had no questions about this and agreed twice during our conversation. He was angry saying he can't do as much as he wants to without a better prosthesis. He was informed that we will begin the process of [facility] [place].

## 2019-07-15 ENCOUNTER — APPOINTMENT (OUTPATIENT)
Dept: SPEECH THERAPY | Facility: HOSPITAL | Age: 69
DRG: 871 | End: 2019-07-15
Payer: COMMERCIAL

## 2019-07-15 ENCOUNTER — APPOINTMENT (OUTPATIENT)
Dept: OCCUPATIONAL THERAPY | Facility: HOSPITAL | Age: 69
DRG: 871 | End: 2019-07-15
Payer: COMMERCIAL

## 2019-07-15 ENCOUNTER — APPOINTMENT (OUTPATIENT)
Dept: PHYSICAL THERAPY | Facility: HOSPITAL | Age: 69
DRG: 871 | End: 2019-07-15
Payer: COMMERCIAL

## 2019-07-15 LAB
ANION GAP SERPL CALCULATED.3IONS-SCNC: 5 MMOL/L (ref 3–14)
BACTERIA SPEC CULT: NORMAL
BACTERIA SPEC CULT: NORMAL
BASOPHILS # BLD AUTO: 0.1 10E9/L (ref 0–0.2)
BASOPHILS NFR BLD AUTO: 0.8 %
BUN SERPL-MCNC: 30 MG/DL (ref 7–30)
CALCIUM SERPL-MCNC: 9.4 MG/DL (ref 8.5–10.1)
CHLORIDE SERPL-SCNC: 104 MMOL/L (ref 94–109)
CO2 SERPL-SCNC: 31 MMOL/L (ref 20–32)
CREAT SERPL-MCNC: 0.8 MG/DL (ref 0.66–1.25)
DIFFERENTIAL METHOD BLD: NORMAL
EOSINOPHIL # BLD AUTO: 0.6 10E9/L (ref 0–0.7)
EOSINOPHIL NFR BLD AUTO: 5.2 %
ERYTHROCYTE [DISTWIDTH] IN BLOOD BY AUTOMATED COUNT: 14.9 % (ref 10–15)
GFR SERPL CREATININE-BSD FRML MDRD: >90 ML/MIN/{1.73_M2}
GLUCOSE SERPL-MCNC: 113 MG/DL (ref 70–99)
HCT VFR BLD AUTO: 45.5 % (ref 40–53)
HGB BLD-MCNC: 15.5 G/DL (ref 13.3–17.7)
IMM GRANULOCYTES # BLD: 0.1 10E9/L (ref 0–0.4)
IMM GRANULOCYTES NFR BLD: 1.2 %
LACTATE BLD-SCNC: 1 MMOL/L (ref 0.7–2)
LYMPHOCYTES # BLD AUTO: 2.1 10E9/L (ref 0.8–5.3)
LYMPHOCYTES NFR BLD AUTO: 19.7 %
Lab: NORMAL
MCH RBC QN AUTO: 29.9 PG (ref 26.5–33)
MCHC RBC AUTO-ENTMCNC: 34.1 G/DL (ref 31.5–36.5)
MCV RBC AUTO: 88 FL (ref 78–100)
MONOCYTES # BLD AUTO: 1 10E9/L (ref 0–1.3)
MONOCYTES NFR BLD AUTO: 9.6 %
NEUTROPHILS # BLD AUTO: 6.7 10E9/L (ref 1.6–8.3)
NEUTROPHILS NFR BLD AUTO: 63.5 %
NRBC # BLD AUTO: 0 10*3/UL
NRBC BLD AUTO-RTO: 0 /100
PLATELET # BLD AUTO: 351 10E9/L (ref 150–450)
POTASSIUM SERPL-SCNC: 4 MMOL/L (ref 3.4–5.3)
RBC # BLD AUTO: 5.18 10E12/L (ref 4.4–5.9)
SODIUM SERPL-SCNC: 140 MMOL/L (ref 133–144)
SPECIMEN SOURCE: NORMAL
SPECIMEN SOURCE: NORMAL
WBC # BLD AUTO: 10.6 10E9/L (ref 4–11)

## 2019-07-15 PROCEDURE — 25000132 ZZH RX MED GY IP 250 OP 250 PS 637: Performed by: INTERNAL MEDICINE

## 2019-07-15 PROCEDURE — 97530 THERAPEUTIC ACTIVITIES: CPT | Mod: GO

## 2019-07-15 PROCEDURE — 97110 THERAPEUTIC EXERCISES: CPT | Mod: GP

## 2019-07-15 PROCEDURE — 25000128 H RX IP 250 OP 636: Performed by: INTERNAL MEDICINE

## 2019-07-15 PROCEDURE — 97530 THERAPEUTIC ACTIVITIES: CPT | Mod: GP

## 2019-07-15 PROCEDURE — 36415 COLL VENOUS BLD VENIPUNCTURE: CPT | Performed by: INTERNAL MEDICINE

## 2019-07-15 PROCEDURE — 80048 BASIC METABOLIC PNL TOTAL CA: CPT | Performed by: INTERNAL MEDICINE

## 2019-07-15 PROCEDURE — 83605 ASSAY OF LACTIC ACID: CPT | Performed by: INTERNAL MEDICINE

## 2019-07-15 PROCEDURE — 99232 SBSQ HOSP IP/OBS MODERATE 35: CPT | Performed by: INTERNAL MEDICINE

## 2019-07-15 PROCEDURE — 12000011 ZZH R&B MS OVERFLOW

## 2019-07-15 PROCEDURE — 92526 ORAL FUNCTION THERAPY: CPT | Mod: GN

## 2019-07-15 PROCEDURE — 85025 COMPLETE CBC W/AUTO DIFF WBC: CPT | Performed by: INTERNAL MEDICINE

## 2019-07-15 RX ORDER — ASPIRIN 81 MG/1
81 TABLET, CHEWABLE ORAL DAILY
Status: DISCONTINUED | OUTPATIENT
Start: 2019-07-15 | End: 2019-07-16 | Stop reason: HOSPADM

## 2019-07-15 RX ADMIN — ASPIRIN 81 MG 81 MG: 81 TABLET ORAL at 09:37

## 2019-07-15 RX ADMIN — NICOTINE 1 PATCH: 21 PATCH, EXTENDED RELEASE TRANSDERMAL at 09:35

## 2019-07-15 RX ADMIN — BUPROPION HYDROCHLORIDE 150 MG: 150 TABLET, FILM COATED, EXTENDED RELEASE ORAL at 09:37

## 2019-07-15 RX ADMIN — MIRTAZAPINE 45 MG: 30 TABLET, FILM COATED ORAL at 21:09

## 2019-07-15 RX ADMIN — BUPROPION HYDROCHLORIDE 150 MG: 150 TABLET, FILM COATED, EXTENDED RELEASE ORAL at 21:09

## 2019-07-15 RX ADMIN — ARIPIPRAZOLE 5 MG: 5 TABLET ORAL at 09:38

## 2019-07-15 RX ADMIN — CEFDINIR 300 MG: 300 CAPSULE ORAL at 09:36

## 2019-07-15 RX ADMIN — ENOXAPARIN SODIUM 40 MG: 40 INJECTION SUBCUTANEOUS at 16:12

## 2019-07-15 RX ADMIN — CEFDINIR 300 MG: 300 CAPSULE ORAL at 21:09

## 2019-07-15 RX ADMIN — TAMSULOSIN HYDROCHLORIDE 0.4 MG: 0.4 CAPSULE ORAL at 09:37

## 2019-07-15 RX ADMIN — GABAPENTIN 300 MG: 300 CAPSULE ORAL at 09:38

## 2019-07-15 ASSESSMENT — MIFFLIN-ST. JEOR: SCORE: 1378.63

## 2019-07-15 ASSESSMENT — ACTIVITIES OF DAILY LIVING (ADL)
ADLS_ACUITY_SCORE: 22

## 2019-07-15 NOTE — PLAN OF CARE
Face to face report given with opportunity to observe patient.    Report given to Sarah Osorio   7/15/2019  7:24 AM

## 2019-07-15 NOTE — PROGRESS NOTES
Discharge Planner SLP   Patient plan for discharge: Acute care  Current status: Dysphagia, NDD2 (mechanical soft, no breads, ground meat), nectar thick liquid, aspiration precautions  Barriers to return to prior living situation: Cognition, impulsive, unsafe eating habits  Recommendations for discharge: Acute care  Rationale for recommendations: Pt requires intensive services to change eating patterns to increase safety. Pt uses successive gulping while drinking and is unable to follow directions in this setting. MBSS/VFSS is not appropriate at this time due to unsafe eating manners and inability to follow directions.        Entered by: Leatha Morales 07/15/2019 9:36 AM

## 2019-07-15 NOTE — PLAN OF CARE
Patient has been compliant with all cares during the night. He had one large gelatinous brown stool this AM. He has been afebrile, VSS and without c/o pain. He follows directions and is able to make his needs known. He has not attempted to get OOB but will sit up at the edge. Exit alarm remains active. Will continue plan of care.

## 2019-07-15 NOTE — PLAN OF CARE
Discharge Planner OT   Patient plan for discharge: Pt open to short term rehab   Current status: Giovana supine>sit, Giovana to don his sock, Giovana x 2 for bed>wheelchair, pt declined grooming tasks at the sink stating he already completed   Barriers to return to prior living situation: Cognition, behaviors, requiring assistance for ADLs and functional mobility, living alone, fall risk   Recommendations for discharge: Short term rehab  Rationale for recommendations: Pt continues to function below his baseline and would not be safe to return home alone. Pt becomes easily agitated and upset with OT treatment. Pt yelling and cursing at clinician/staff. Pt needs a lot of encouragement to get out of bed, too.         Entered by: Haley Lozoya 07/15/2019 11:46 AM

## 2019-07-15 NOTE — PROGRESS NOTES
[facility]-would accept him tomorrow morning, but has only been able to get prior auth approval for 3 days (at that time they would need to submit for more days, however he would be at risk that his cont'd stay would be denied and then have to discharge or pay privately)  [facility]-follow up call made, they were reminded that he is eligible for the [facility] snf benefit  [facility]-no beds, also do not allow smoking  [facility]-cannot accept him now due to their existing patient load  [facility]-not accepting new patients at this time due to staffing  [facility]-screening, will give us an answer in the morning    His VA  (filling in) Patricia was updated on the above plans.

## 2019-07-15 NOTE — PLAN OF CARE
Pt presented with VSS, has denied pain throughout the day. Pt has gotten up to his wheelchair X2 today. Lungs are clear with crackles in his LLL. Incontinent of stool and urine. Pt refusing any solid food, encouraged pt to drink ensures. Pt is alert to self and place. Responds to redirection. Pt is resting in bed, will continue to monitor.
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
## 2019-07-16 VITALS
RESPIRATION RATE: 20 BRPM | SYSTOLIC BLOOD PRESSURE: 103 MMHG | DIASTOLIC BLOOD PRESSURE: 85 MMHG | OXYGEN SATURATION: 92 % | WEIGHT: 140.21 LBS | HEART RATE: 103 BPM | TEMPERATURE: 96.7 F | HEIGHT: 69 IN | BODY MASS INDEX: 20.77 KG/M2

## 2019-07-16 LAB
ANION GAP SERPL CALCULATED.3IONS-SCNC: 5 MMOL/L (ref 3–14)
BASOPHILS # BLD AUTO: 0.1 10E9/L (ref 0–0.2)
BASOPHILS NFR BLD AUTO: 0.8 %
BUN SERPL-MCNC: 31 MG/DL (ref 7–30)
CALCIUM SERPL-MCNC: 9.9 MG/DL (ref 8.5–10.1)
CHLORIDE SERPL-SCNC: 101 MMOL/L (ref 94–109)
CO2 SERPL-SCNC: 34 MMOL/L (ref 20–32)
CREAT SERPL-MCNC: 0.98 MG/DL (ref 0.66–1.25)
DIFFERENTIAL METHOD BLD: NORMAL
EOSINOPHIL # BLD AUTO: 0.5 10E9/L (ref 0–0.7)
EOSINOPHIL NFR BLD AUTO: 4.8 %
ERYTHROCYTE [DISTWIDTH] IN BLOOD BY AUTOMATED COUNT: 14.8 % (ref 10–15)
GFR SERPL CREATININE-BSD FRML MDRD: 79 ML/MIN/{1.73_M2}
GLUCOSE SERPL-MCNC: 154 MG/DL (ref 70–99)
HCT VFR BLD AUTO: 48.2 % (ref 40–53)
HGB BLD-MCNC: 16.1 G/DL (ref 13.3–17.7)
IMM GRANULOCYTES # BLD: 0.2 10E9/L (ref 0–0.4)
IMM GRANULOCYTES NFR BLD: 1.7 %
LYMPHOCYTES # BLD AUTO: 2.6 10E9/L (ref 0.8–5.3)
LYMPHOCYTES NFR BLD AUTO: 26.1 %
MCH RBC QN AUTO: 29.9 PG (ref 26.5–33)
MCHC RBC AUTO-ENTMCNC: 33.4 G/DL (ref 31.5–36.5)
MCV RBC AUTO: 90 FL (ref 78–100)
MONOCYTES # BLD AUTO: 0.7 10E9/L (ref 0–1.3)
MONOCYTES NFR BLD AUTO: 7.2 %
NEUTROPHILS # BLD AUTO: 6 10E9/L (ref 1.6–8.3)
NEUTROPHILS NFR BLD AUTO: 59.4 %
NRBC # BLD AUTO: 0 10*3/UL
NRBC BLD AUTO-RTO: 0 /100
PLATELET # BLD AUTO: 376 10E9/L (ref 150–450)
POTASSIUM SERPL-SCNC: 3.7 MMOL/L (ref 3.4–5.3)
RBC # BLD AUTO: 5.38 10E12/L (ref 4.4–5.9)
SODIUM SERPL-SCNC: 140 MMOL/L (ref 133–144)
WBC # BLD AUTO: 10.1 10E9/L (ref 4–11)

## 2019-07-16 PROCEDURE — 80048 BASIC METABOLIC PNL TOTAL CA: CPT | Performed by: INTERNAL MEDICINE

## 2019-07-16 PROCEDURE — 85025 COMPLETE CBC W/AUTO DIFF WBC: CPT | Performed by: INTERNAL MEDICINE

## 2019-07-16 PROCEDURE — 25000132 ZZH RX MED GY IP 250 OP 250 PS 637: Performed by: INTERNAL MEDICINE

## 2019-07-16 PROCEDURE — 99239 HOSP IP/OBS DSCHRG MGMT >30: CPT | Performed by: NURSE PRACTITIONER

## 2019-07-16 PROCEDURE — 36415 COLL VENOUS BLD VENIPUNCTURE: CPT | Performed by: INTERNAL MEDICINE

## 2019-07-16 RX ORDER — TAMSULOSIN HYDROCHLORIDE 0.4 MG/1
0.4 CAPSULE ORAL DAILY
DISCHARGE
Start: 2019-07-17

## 2019-07-16 RX ORDER — CEFDINIR 300 MG/1
300 CAPSULE ORAL EVERY 12 HOURS
DISCHARGE
Start: 2019-07-16 | End: 2019-07-18

## 2019-07-16 RX ORDER — GABAPENTIN 300 MG/1
300 CAPSULE ORAL AT BEDTIME
Status: ON HOLD | DISCHARGE
Start: 2019-07-16 | End: 2020-01-01

## 2019-07-16 RX ORDER — BUPROPION HYDROCHLORIDE 150 MG/1
150 TABLET, EXTENDED RELEASE ORAL 2 TIMES DAILY
Status: ON HOLD | DISCHARGE
Start: 2019-07-16 | End: 2020-01-01

## 2019-07-16 RX ORDER — ASPIRIN 81 MG/1
81 TABLET, CHEWABLE ORAL DAILY
Status: ON HOLD | DISCHARGE
Start: 2019-07-17 | End: 2020-01-01

## 2019-07-16 RX ORDER — NICOTINE 21 MG/24HR
1 PATCH, TRANSDERMAL 24 HOURS TRANSDERMAL DAILY
Status: ON HOLD | DISCHARGE
Start: 2019-07-17 | End: 2020-01-01

## 2019-07-16 RX ADMIN — BUPROPION HYDROCHLORIDE 150 MG: 150 TABLET, FILM COATED, EXTENDED RELEASE ORAL at 09:09

## 2019-07-16 RX ADMIN — GABAPENTIN 300 MG: 300 CAPSULE ORAL at 09:09

## 2019-07-16 RX ADMIN — ASPIRIN 81 MG 81 MG: 81 TABLET ORAL at 09:09

## 2019-07-16 RX ADMIN — CEFDINIR 300 MG: 300 CAPSULE ORAL at 09:09

## 2019-07-16 RX ADMIN — TAMSULOSIN HYDROCHLORIDE 0.4 MG: 0.4 CAPSULE ORAL at 09:09

## 2019-07-16 RX ADMIN — ARIPIPRAZOLE 5 MG: 5 TABLET ORAL at 09:09

## 2019-07-16 RX ADMIN — NICOTINE 1 PATCH: 21 PATCH, EXTENDED RELEASE TRANSDERMAL at 08:59

## 2019-07-16 ASSESSMENT — ACTIVITIES OF DAILY LIVING (ADL)
ADLS_ACUITY_SCORE: 22

## 2019-07-16 ASSESSMENT — MIFFLIN-ST. JEOR: SCORE: 1406.38

## 2019-07-16 NOTE — PLAN OF CARE
Patient is seen in room, denies pain, knows where he is but does not know date or time or month.  Is incontinent of bowel and bladder multiple times today prior to discharging.  [facility] [place]    Patient does request Ensure as he enjoys it.  Pleasant and cooperative with cares.

## 2019-07-16 NOTE — PLAN OF CARE
Occupational Therapy Discharge Summary    Reason for therapy discharge:    Discharged to transitional care facility.    Progress towards therapy goal(s). See goals on Care Plan in [facility] electronic health record for goal details.  Goals partially met.  Barriers to achieving goals:   discharge from facility.    Therapy recommendation(s):    Continued therapy is recommended.  Rationale/Recommendations:  to continue improving pt's strength, endurance, and safety for increased independence in ADLs.

## 2019-07-16 NOTE — PLAN OF CARE
Face to face report given with opportunity to observe patient.    Report given to Veronika Blake   7/16/2019  7:10 AM
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
## 2019-07-16 NOTE — PLAN OF CARE
Face to face report given with opportunity to observe patient.    Report given to MARIEL Aguirre.     Sarah Brooks   7/15/2019  7:14 PM

## 2019-07-16 NOTE — PLAN OF CARE
Patient discharged at 11:40 AM via wheel chair accompanied by sister and staff. Prescriptions sent with patient to fill . All belongings sent with patient.     Discharge instructions reviewed with patient & sister. Listed belongings gathered and returned to patient. yes    [facility] [place] [place]   Report called to [facility] [place], [place]    Core Measures and Vaccines  Core Measures applicable during stay: Yes. If yes, state diagnosis: Pneumonia  Pneumonia and Influenza given prior to discharge, if indicated: N/A    Surgical Patient   Surgical Procedures during stay: N/A  Did patient receive discharge instruction on wound care and recognition of infection symptoms? N/A    MISC  Follow up appointment made:  [facility] will make appointments  Home and hospital aquired medications returned to patient: N/A  Patient reports pain was well managed at discharge: Yes

## 2019-07-16 NOTE — PROGRESS NOTES
Name: Tim Watson    MRN#: [id number]    Reason for Hospitalization: Pneumonia of left lower lobe due to infectious organism (H) [J18.1]  Congestive heart failure, unspecified HF chronicity, unspecified heart failure type (H) [I50.9]    Discharge Date: 7/16/2019    Patient / Family response to discharge plan: in agreement    Follow-Up Appt: No future appointments.    Other Providers (Care Coordinator, County Services, PCA services etc): Yes: [employer], [employer] [facility], [facility]    Discharge Disposition: long term care facility    PAS-RR    Per DHS regulation, CTS team completed and submitted PAS-RR to [place] [employer] via the [employer]. CTS team advised SNF and they are aware a PAS-RR has been submitted.     CTS team reviewed with pt or health care agent that they may be contacted for a follow up appointment within 10 days of hospital discharge if SNF stay is <30 days. Contact information for [employer] was also provided.     Pt or health care agent verbalized understanding.     PAS-RR # [id number]      Kera Anderson

## 2019-07-16 NOTE — PLAN OF CARE
"  Problem: Respiratory Compromise (Pneumonia)  Goal: Effective Oxygenation and Ventilation  7/16/2019 0521 by Carla Blake, RN  Outcome: Improving   Remained room air. Frequent but adequate cough to help clear secretions.     Problem: Infection (Pneumonia)  Goal: Resolution of Infection Signs/Symptoms  7/16/2019 0521 by Carla Blake, RN  Outcome: Improving   VSS. Baseline mentation.  PO Omnicef.    Problem: Fluid Imbalance (Pneumonia)  Goal: Fluid Balance  7/16/2019 0521 by Carla Blake, RN  Outcome: Improving   Poor meal intake, but likes ensure shakes and juices.     Problem: Fall Injury Risk  Goal: Absence of Fall and Fall-Related Injury  7/16/2019 0521 by Carla Blake, RN  Outcome: Improving   Alarms. Pt has been calling for staff appropriately however.    Problem: Adult Inpatient Plan of Care  Goal: Readiness for Transition of Care  7/16/2019 0521 by Carla Blake, RN  Outcome: Improving   Pt excited and anticipates \"a new home\" today    "

## 2019-07-20 LAB
BACTERIA SPEC CULT: ABNORMAL
SPECIMEN SOURCE: ABNORMAL

## 2020-01-01 ENCOUNTER — APPOINTMENT (OUTPATIENT)
Dept: PHYSICAL THERAPY | Facility: HOSPITAL | Age: 70
DRG: 871 | End: 2020-01-01
Payer: COMMERCIAL

## 2020-01-01 ENCOUNTER — APPOINTMENT (OUTPATIENT)
Dept: CT IMAGING | Facility: HOSPITAL | Age: 70
DRG: 871 | End: 2020-01-01
Attending: NURSE PRACTITIONER
Payer: COMMERCIAL

## 2020-01-01 ENCOUNTER — APPOINTMENT (OUTPATIENT)
Dept: PHYSICAL THERAPY | Facility: HOSPITAL | Age: 70
DRG: 871 | End: 2020-01-01
Attending: HOSPITALIST
Payer: COMMERCIAL

## 2020-01-01 ENCOUNTER — APPOINTMENT (OUTPATIENT)
Dept: SPEECH THERAPY | Facility: HOSPITAL | Age: 70
DRG: 871 | End: 2020-01-01
Attending: INTERNAL MEDICINE
Payer: COMMERCIAL

## 2020-01-01 ENCOUNTER — APPOINTMENT (OUTPATIENT)
Dept: OCCUPATIONAL THERAPY | Facility: HOSPITAL | Age: 70
DRG: 871 | End: 2020-01-01
Payer: COMMERCIAL

## 2020-01-01 ENCOUNTER — APPOINTMENT (OUTPATIENT)
Dept: SPEECH THERAPY | Facility: HOSPITAL | Age: 70
DRG: 871 | End: 2020-01-01
Payer: COMMERCIAL

## 2020-01-01 ENCOUNTER — APPOINTMENT (OUTPATIENT)
Dept: GENERAL RADIOLOGY | Facility: HOSPITAL | Age: 70
DRG: 871 | End: 2020-01-01
Attending: UROLOGY
Payer: COMMERCIAL

## 2020-01-01 ENCOUNTER — TRANSFERRED RECORDS (OUTPATIENT)
Dept: HEALTH INFORMATION MANAGEMENT | Facility: CLINIC | Age: 70
End: 2020-01-01

## 2020-01-01 ENCOUNTER — APPOINTMENT (OUTPATIENT)
Dept: OCCUPATIONAL THERAPY | Facility: HOSPITAL | Age: 70
DRG: 871 | End: 2020-01-01
Attending: HOSPITALIST
Payer: COMMERCIAL

## 2020-01-01 ENCOUNTER — HOSPITAL ENCOUNTER (INPATIENT)
Dept: CARDIOLOGY | Facility: HOSPITAL | Age: 70
Discharge: HOME | DRG: 871 | End: 2020-01-17
Attending: HOSPITALIST
Payer: COMMERCIAL

## 2020-01-01 ENCOUNTER — HOSPITAL ENCOUNTER (INPATIENT)
Facility: HOSPITAL | Age: 70
LOS: 8 days | Discharge: ANOTHER HOSPITAL | DRG: 871 | End: 2020-01-24
Attending: NURSE PRACTITIONER | Admitting: HOSPITALIST
Payer: COMMERCIAL

## 2020-01-01 VITALS
OXYGEN SATURATION: 94 % | SYSTOLIC BLOOD PRESSURE: 124 MMHG | WEIGHT: 124.12 LBS | RESPIRATION RATE: 20 BRPM | TEMPERATURE: 97.1 F | BODY MASS INDEX: 18.33 KG/M2 | DIASTOLIC BLOOD PRESSURE: 81 MMHG | HEART RATE: 84 BPM

## 2020-01-01 DIAGNOSIS — N20.0 NEPHROLITHIASIS: Primary | ICD-10-CM

## 2020-01-01 DIAGNOSIS — N39.0 URINARY TRACT INFECTION: ICD-10-CM

## 2020-01-01 DIAGNOSIS — I48.0 PAROXYSMAL ATRIAL FIBRILLATION (H): ICD-10-CM

## 2020-01-01 DIAGNOSIS — B35.6 JOCK ITCH: ICD-10-CM

## 2020-01-01 DIAGNOSIS — S09.90XA INJURY OF HEAD, INITIAL ENCOUNTER: ICD-10-CM
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
## 2020-01-01 DIAGNOSIS — F43.10 PTSD (POST-TRAUMATIC STRESS DISORDER): ICD-10-CM

## 2020-01-01 DIAGNOSIS — R41.82 ALTERED MENTAL STATUS, UNSPECIFIED ALTERED MENTAL STATUS TYPE: Primary | ICD-10-CM

## 2020-01-01 LAB
ALBUMIN SERPL-MCNC: 2.4 G/DL (ref 3.4–5)
ALBUMIN SERPL-MCNC: 2.4 G/DL (ref 3.4–5)
ALBUMIN SERPL-MCNC: 3.1 G/DL (ref 3.4–5)
ALBUMIN UR-MCNC: 100 MG/DL
ALP SERPL-CCNC: 117 U/L (ref 40–150)
ALP SERPL-CCNC: 78 U/L (ref 40–150)
ALP SERPL-CCNC: 89 U/L (ref 40–150)
ALT SERPL W P-5'-P-CCNC: 19 U/L (ref 0–70)
ALT SERPL W P-5'-P-CCNC: 24 U/L (ref 0–70)
ALT SERPL W P-5'-P-CCNC: 25 U/L (ref 0–70)
ANION GAP SERPL CALCULATED.3IONS-SCNC: 12 MMOL/L (ref 3–14)
ANION GAP SERPL CALCULATED.3IONS-SCNC: 7 MMOL/L (ref 3–14)
ANION GAP SERPL CALCULATED.3IONS-SCNC: 8 MMOL/L (ref 3–14)
APPEARANCE UR: ABNORMAL
AST SERPL W P-5'-P-CCNC: 15 U/L (ref 0–45)
AST SERPL W P-5'-P-CCNC: 28 U/L (ref 0–45)
AST SERPL W P-5'-P-CCNC: 34 U/L (ref 0–45)
BACTERIA #/AREA URNS HPF: ABNORMAL /HPF
BACTERIA SPEC CULT: ABNORMAL
BACTERIA SPEC CULT: NORMAL
BASOPHILS # BLD AUTO: 0.1 10E9/L (ref 0–0.2)
BASOPHILS NFR BLD AUTO: 0.4 %
BILIRUB DIRECT SERPL-MCNC: <0.1 MG/DL (ref 0–0.2)
BILIRUB SERPL-MCNC: 0.1 MG/DL (ref 0.2–1.3)
BILIRUB SERPL-MCNC: 0.9 MG/DL (ref 0.2–1.3)
BILIRUB SERPL-MCNC: 1 MG/DL (ref 0.2–1.3)
BILIRUB UR QL STRIP: NEGATIVE
BUN SERPL-MCNC: 25 MG/DL (ref 7–30)
BUN SERPL-MCNC: 34 MG/DL (ref 7–30)
BUN SERPL-MCNC: 35 MG/DL (ref 7–30)
C DIFF TOX B STL QL: NEGATIVE
CALCIUM SERPL-MCNC: 8 MG/DL (ref 8.5–10.1)
CALCIUM SERPL-MCNC: 8 MG/DL (ref 8.5–10.1)
CALCIUM SERPL-MCNC: 8.8 MG/DL (ref 8.5–10.1)
CHLORIDE SERPL-SCNC: 106 MMOL/L (ref 94–109)
CHLORIDE SERPL-SCNC: 109 MMOL/L (ref 94–109)
CHLORIDE SERPL-SCNC: 109 MMOL/L (ref 94–109)
CK SERPL-CCNC: 523 U/L (ref 30–300)
CK SERPL-CCNC: 584 U/L (ref 30–300)
CO2 SERPL-SCNC: 24 MMOL/L (ref 20–32)
CO2 SERPL-SCNC: 25 MMOL/L (ref 20–32)
CO2 SERPL-SCNC: 26 MMOL/L (ref 20–32)
COLOR UR AUTO: YELLOW
CREAT SERPL-MCNC: 0.64 MG/DL (ref 0.66–1.25)
CREAT SERPL-MCNC: 0.74 MG/DL (ref 0.66–1.25)
CREAT SERPL-MCNC: 0.78 MG/DL (ref 0.66–1.25)
CREAT SERPL-MCNC: 0.81 MG/DL (ref 0.66–1.25)
DIFFERENTIAL METHOD BLD: ABNORMAL
EOSINOPHIL # BLD AUTO: 0 10E9/L (ref 0–0.7)
EOSINOPHIL NFR BLD AUTO: 0 %
ERYTHROCYTE [DISTWIDTH] IN BLOOD BY AUTOMATED COUNT: 13.2 % (ref 10–15)
ERYTHROCYTE [DISTWIDTH] IN BLOOD BY AUTOMATED COUNT: 13.5 % (ref 10–15)
ERYTHROCYTE [DISTWIDTH] IN BLOOD BY AUTOMATED COUNT: 13.6 % (ref 10–15)
ERYTHROCYTE [DISTWIDTH] IN BLOOD BY AUTOMATED COUNT: 13.6 % (ref 10–15)
GFR SERPL CREATININE-BSD FRML MDRD: >90 ML/MIN/{1.73_M2}
GLUCOSE BLDC GLUCOMTR-MCNC: 102 MG/DL (ref 70–99)
GLUCOSE BLDC GLUCOMTR-MCNC: 104 MG/DL (ref 70–99)
GLUCOSE BLDC GLUCOMTR-MCNC: 108 MG/DL (ref 70–99)
GLUCOSE BLDC GLUCOMTR-MCNC: 108 MG/DL (ref 70–99)
GLUCOSE BLDC GLUCOMTR-MCNC: 110 MG/DL (ref 70–99)
GLUCOSE BLDC GLUCOMTR-MCNC: 111 MG/DL (ref 70–99)
GLUCOSE BLDC GLUCOMTR-MCNC: 111 MG/DL (ref 70–99)
GLUCOSE BLDC GLUCOMTR-MCNC: 117 MG/DL (ref 70–99)
GLUCOSE BLDC GLUCOMTR-MCNC: 127 MG/DL (ref 70–99)
GLUCOSE BLDC GLUCOMTR-MCNC: 161 MG/DL (ref 70–99)
GLUCOSE BLDC GLUCOMTR-MCNC: 65 MG/DL (ref 70–99)
GLUCOSE BLDC GLUCOMTR-MCNC: 68 MG/DL (ref 70–99)
GLUCOSE BLDC GLUCOMTR-MCNC: 73 MG/DL (ref 70–99)
GLUCOSE BLDC GLUCOMTR-MCNC: 80 MG/DL (ref 70–99)
GLUCOSE BLDC GLUCOMTR-MCNC: 82 MG/DL (ref 70–99)
GLUCOSE BLDC GLUCOMTR-MCNC: 84 MG/DL (ref 70–99)
GLUCOSE BLDC GLUCOMTR-MCNC: 87 MG/DL (ref 70–99)
GLUCOSE BLDC GLUCOMTR-MCNC: 90 MG/DL (ref 70–99)
GLUCOSE BLDC GLUCOMTR-MCNC: 92 MG/DL (ref 70–99)
GLUCOSE BLDC GLUCOMTR-MCNC: 93 MG/DL (ref 70–99)
GLUCOSE BLDC GLUCOMTR-MCNC: 95 MG/DL (ref 70–99)
GLUCOSE BLDC GLUCOMTR-MCNC: 99 MG/DL (ref 70–99)
GLUCOSE SERPL-MCNC: 106 MG/DL (ref 70–99)
GLUCOSE SERPL-MCNC: 215 MG/DL (ref 70–99)
GLUCOSE SERPL-MCNC: 72 MG/DL (ref 70–99)
GLUCOSE UR STRIP-MCNC: NEGATIVE MG/DL
HCT VFR BLD AUTO: 41.2 % (ref 40–53)
HCT VFR BLD AUTO: 41.9 % (ref 40–53)
HCT VFR BLD AUTO: 42.2 % (ref 40–53)
HCT VFR BLD AUTO: 48.3 % (ref 40–53)
HGB BLD-MCNC: 13.9 G/DL (ref 13.3–17.7)
HGB BLD-MCNC: 13.9 G/DL (ref 13.3–17.7)
HGB BLD-MCNC: 14 G/DL (ref 13.3–17.7)
HGB BLD-MCNC: 16.3 G/DL (ref 13.3–17.7)
HGB UR QL STRIP: ABNORMAL
IMM GRANULOCYTES # BLD: 0.1 10E9/L (ref 0–0.4)
IMM GRANULOCYTES NFR BLD: 0.6 %
INR PPP: 1.27 (ref 0.86–1.14)
KETONES UR STRIP-MCNC: 40 MG/DL
LACTATE BLD-SCNC: 1.5 MMOL/L (ref 0.7–2)
LACTATE BLD-SCNC: 1.5 MMOL/L (ref 0.7–2)
LACTATE BLD-SCNC: 1.6 MMOL/L (ref 0.7–2)
LACTATE BLD-SCNC: 1.8 MMOL/L (ref 0.7–2)
LEUKOCYTE ESTERASE UR QL STRIP: ABNORMAL
LYMPHOCYTES # BLD AUTO: 1.8 10E9/L (ref 0.8–5.3)
LYMPHOCYTES NFR BLD AUTO: 10.6 %
Lab: NORMAL
Lab: NORMAL
MAGNESIUM SERPL-MCNC: 1.4 MG/DL (ref 1.6–2.3)
MAGNESIUM SERPL-MCNC: 1.8 MG/DL (ref 1.6–2.3)
MAGNESIUM SERPL-MCNC: 2 MG/DL (ref 1.6–2.3)
MCH RBC QN AUTO: 28.9 PG (ref 26.5–33)
MCH RBC QN AUTO: 29 PG (ref 26.5–33)
MCH RBC QN AUTO: 29.2 PG (ref 26.5–33)
MCH RBC QN AUTO: 29.2 PG (ref 26.5–33)
MCHC RBC AUTO-ENTMCNC: 33.2 G/DL (ref 31.5–36.5)
MCHC RBC AUTO-ENTMCNC: 33.2 G/DL (ref 31.5–36.5)
MCHC RBC AUTO-ENTMCNC: 33.7 G/DL (ref 31.5–36.5)
MCHC RBC AUTO-ENTMCNC: 33.7 G/DL (ref 31.5–36.5)
MCV RBC AUTO: 86 FL (ref 78–100)
MCV RBC AUTO: 86 FL (ref 78–100)
MCV RBC AUTO: 87 FL (ref 78–100)
MCV RBC AUTO: 88 FL (ref 78–100)
MONOCYTES # BLD AUTO: 1.8 10E9/L (ref 0–1.3)
MONOCYTES NFR BLD AUTO: 10.6 %
NEUTROPHILS # BLD AUTO: 13 10E9/L (ref 1.6–8.3)
NEUTROPHILS NFR BLD AUTO: 77.8 %
NITRATE UR QL: NEGATIVE
NRBC # BLD AUTO: 0 10*3/UL
NRBC BLD AUTO-RTO: 0 /100
NT-PROBNP SERPL-MCNC: 8331 PG/ML (ref 0–900)
PH UR STRIP: 5.5 PH (ref 4.7–8)
PLATELET # BLD AUTO: 213 10E9/L (ref 150–450)
PLATELET # BLD AUTO: 293 10E9/L (ref 150–450)
PLATELET # BLD AUTO: 295 10E9/L (ref 150–450)
PLATELET # BLD AUTO: 362 10E9/L (ref 150–450)
PLATELET # BLD AUTO: 365 10E9/L (ref 150–450)
POTASSIUM SERPL-SCNC: 3.3 MMOL/L (ref 3.4–5.3)
POTASSIUM SERPL-SCNC: 3.5 MMOL/L (ref 3.4–5.3)
POTASSIUM SERPL-SCNC: 3.6 MMOL/L (ref 3.4–5.3)
POTASSIUM SERPL-SCNC: 3.8 MMOL/L (ref 3.4–5.3)
PROCALCITONIN SERPL-MCNC: 0.08 NG/ML
PROT SERPL-MCNC: 5.9 G/DL (ref 6.8–8.8)
PROT SERPL-MCNC: 6.4 G/DL (ref 6.8–8.8)
PROT SERPL-MCNC: 7.3 G/DL (ref 6.8–8.8)
RBC # BLD AUTO: 4.79 10E12/L (ref 4.4–5.9)
RBC # BLD AUTO: 4.8 10E12/L (ref 4.4–5.9)
RBC # BLD AUTO: 4.81 10E12/L (ref 4.4–5.9)
RBC # BLD AUTO: 5.59 10E12/L (ref 4.4–5.9)
RBC #/AREA URNS AUTO: 148 /HPF (ref 0–2)
SODIUM SERPL-SCNC: 138 MMOL/L (ref 133–144)
SODIUM SERPL-SCNC: 142 MMOL/L (ref 133–144)
SOURCE: ABNORMAL
SP GR UR STRIP: 1.02 (ref 1–1.03)
SPECIMEN SOURCE: ABNORMAL
SPECIMEN SOURCE: NORMAL
SQUAMOUS #/AREA URNS AUTO: 1 /HPF (ref 0–1)
UROBILINOGEN UR STRIP-MCNC: NORMAL MG/DL (ref 0–2)
WBC # BLD AUTO: 12.6 10E9/L (ref 4–11)
WBC # BLD AUTO: 15.8 10E9/L (ref 4–11)
WBC # BLD AUTO: 16.7 10E9/L (ref 4–11)
WBC # BLD AUTO: 7.5 10E9/L (ref 4–11)
WBC #/AREA URNS AUTO: >182 /HPF (ref 0–5)
WBC CLUMPS #/AREA URNS HPF: PRESENT /HPF

## 2020-01-01 PROCEDURE — 25000128 H RX IP 250 OP 636: Performed by: INTERNAL MEDICINE

## 2020-01-01 PROCEDURE — 36415 COLL VENOUS BLD VENIPUNCTURE: CPT | Performed by: HOSPITALIST

## 2020-01-01 PROCEDURE — 92526 ORAL FUNCTION THERAPY: CPT | Mod: GN

## 2020-01-01 PROCEDURE — 12000000 ZZH R&B MED SURG/OB

## 2020-01-01 PROCEDURE — 84484 ASSAY OF TROPONIN QUANT: CPT | Performed by: NURSE PRACTITIONER

## 2020-01-01 PROCEDURE — 25000132 ZZH RX MED GY IP 250 OP 250 PS 637: Performed by: NURSE PRACTITIONER

## 2020-01-01 PROCEDURE — 87493 C DIFF AMPLIFIED PROBE: CPT | Performed by: HOSPITALIST

## 2020-01-01 PROCEDURE — 93010 ELECTROCARDIOGRAM REPORT: CPT | Performed by: INTERNAL MEDICINE

## 2020-01-01 PROCEDURE — 25000132 ZZH RX MED GY IP 250 OP 250 PS 637: Performed by: INTERNAL MEDICINE

## 2020-01-01 PROCEDURE — 83880 ASSAY OF NATRIURETIC PEPTIDE: CPT | Performed by: NURSE PRACTITIONER

## 2020-01-01 PROCEDURE — 70450 CT HEAD/BRAIN W/O DYE: CPT | Mod: TC

## 2020-01-01 PROCEDURE — 99285 EMERGENCY DEPT VISIT HI MDM: CPT | Mod: 25

## 2020-01-01 PROCEDURE — 36415 COLL VENOUS BLD VENIPUNCTURE: CPT | Performed by: INTERNAL MEDICINE

## 2020-01-01 PROCEDURE — 40000786 ZZHCL STATISTIC ACTIVE MRSA SURVEILLANCE CULTURE: Performed by: HOSPITALIST

## 2020-01-01 PROCEDURE — 82565 ASSAY OF CREATININE: CPT | Performed by: INTERNAL MEDICINE

## 2020-01-01 PROCEDURE — 99232 SBSQ HOSP IP/OBS MODERATE 35: CPT | Performed by: INTERNAL MEDICINE

## 2020-01-01 PROCEDURE — 25000125 ZZHC RX 250: Performed by: INTERNAL MEDICINE

## 2020-01-01 PROCEDURE — 25500064 ZZH RX 255 OP 636: Performed by: HOSPITALIST

## 2020-01-01 PROCEDURE — 80048 BASIC METABOLIC PNL TOTAL CA: CPT | Performed by: INTERNAL MEDICINE

## 2020-01-01 PROCEDURE — 99231 SBSQ HOSP IP/OBS SF/LOW 25: CPT | Performed by: UROLOGY

## 2020-01-01 PROCEDURE — 00000146 ZZHCL STATISTIC GLUCOSE BY METER IP

## 2020-01-01 PROCEDURE — 93005 ELECTROCARDIOGRAM TRACING: CPT

## 2020-01-01 PROCEDURE — 85610 PROTHROMBIN TIME: CPT | Performed by: HOSPITALIST

## 2020-01-01 PROCEDURE — 25800030 ZZH RX IP 258 OP 636: Performed by: INTERNAL MEDICINE

## 2020-01-01 PROCEDURE — 82550 ASSAY OF CK (CPK): CPT | Performed by: NURSE PRACTITIONER

## 2020-01-01 PROCEDURE — 97129 THER IVNTJ 1ST 15 MIN: CPT | Mod: GO

## 2020-01-01 PROCEDURE — 74176 CT ABD & PELVIS W/O CONTRAST: CPT | Mod: TC

## 2020-01-01 PROCEDURE — 85025 COMPLETE CBC W/AUTO DIFF WBC: CPT | Performed by: NURSE PRACTITIONER

## 2020-01-01 PROCEDURE — 25000128 H RX IP 250 OP 636: Performed by: NURSE PRACTITIONER

## 2020-01-01 PROCEDURE — 25800025 ZZH RX 258: Performed by: HOSPITALIST

## 2020-01-01 PROCEDURE — 25000128 H RX IP 250 OP 636: Performed by: HOSPITALIST

## 2020-01-01 PROCEDURE — 25800025 ZZH RX 258: Performed by: INTERNAL MEDICINE

## 2020-01-01 PROCEDURE — 25000132 ZZH RX MED GY IP 250 OP 250 PS 637: Performed by: HOSPITALIST

## 2020-01-01 PROCEDURE — 80053 COMPREHEN METABOLIC PANEL: CPT | Performed by: HOSPITALIST

## 2020-01-01 PROCEDURE — 84132 ASSAY OF SERUM POTASSIUM: CPT | Performed by: HOSPITALIST

## 2020-01-01 PROCEDURE — 97530 THERAPEUTIC ACTIVITIES: CPT | Mod: GP | Performed by: PHYSICAL THERAPIST

## 2020-01-01 PROCEDURE — 25800030 ZZH RX IP 258 OP 636: Performed by: HOSPITALIST

## 2020-01-01 PROCEDURE — 20000003 ZZH R&B ICU

## 2020-01-01 PROCEDURE — 99223 1ST HOSP IP/OBS HIGH 75: CPT | Performed by: HOSPITALIST

## 2020-01-01 PROCEDURE — 83605 ASSAY OF LACTIC ACID: CPT | Performed by: HOSPITALIST

## 2020-01-01 PROCEDURE — 97530 THERAPEUTIC ACTIVITIES: CPT | Mod: GO

## 2020-01-01 PROCEDURE — 97530 THERAPEUTIC ACTIVITIES: CPT | Mod: GP

## 2020-01-01 PROCEDURE — 80053 COMPREHEN METABOLIC PANEL: CPT | Performed by: NURSE PRACTITIONER

## 2020-01-01 PROCEDURE — 82550 ASSAY OF CK (CPK): CPT | Performed by: HOSPITALIST

## 2020-01-01 PROCEDURE — 99283 EMERGENCY DEPT VISIT LOW MDM: CPT | Mod: Z6 | Performed by: NURSE PRACTITIONER

## 2020-01-01 PROCEDURE — 85027 COMPLETE CBC AUTOMATED: CPT | Performed by: INTERNAL MEDICINE

## 2020-01-01 PROCEDURE — 85049 AUTOMATED PLATELET COUNT: CPT | Performed by: HOSPITALIST

## 2020-01-01 PROCEDURE — 82565 ASSAY OF CREATININE: CPT | Performed by: HOSPITALIST

## 2020-01-01 PROCEDURE — 83735 ASSAY OF MAGNESIUM: CPT | Performed by: INTERNAL MEDICINE

## 2020-01-01 PROCEDURE — 87040 BLOOD CULTURE FOR BACTERIA: CPT | Performed by: INTERNAL MEDICINE

## 2020-01-01 PROCEDURE — 83735 ASSAY OF MAGNESIUM: CPT | Performed by: HOSPITALIST

## 2020-01-01 PROCEDURE — 85027 COMPLETE CBC AUTOMATED: CPT | Performed by: HOSPITALIST

## 2020-01-01 PROCEDURE — 93306 TTE W/DOPPLER COMPLETE: CPT | Mod: 26 | Performed by: INTERNAL MEDICINE

## 2020-01-01 PROCEDURE — 84295 ASSAY OF SERUM SODIUM: CPT | Performed by: INTERNAL MEDICINE

## 2020-01-01 PROCEDURE — 84145 PROCALCITONIN (PCT): CPT | Performed by: NURSE PRACTITIONER

## 2020-01-01 PROCEDURE — 87086 URINE CULTURE/COLONY COUNT: CPT | Performed by: NURSE PRACTITIONER

## 2020-01-01 PROCEDURE — 99233 SBSQ HOSP IP/OBS HIGH 50: CPT | Performed by: UROLOGY

## 2020-01-01 PROCEDURE — 92610 EVALUATE SWALLOWING FUNCTION: CPT | Mod: GN

## 2020-01-01 PROCEDURE — 83605 ASSAY OF LACTIC ACID: CPT | Performed by: INTERNAL MEDICINE

## 2020-01-01 PROCEDURE — 99231 SBSQ HOSP IP/OBS SF/LOW 25: CPT | Performed by: NURSE PRACTITIONER

## 2020-01-01 PROCEDURE — 74018 RADEX ABDOMEN 1 VIEW: CPT | Mod: TC

## 2020-01-01 PROCEDURE — 81001 URINALYSIS AUTO W/SCOPE: CPT | Performed by: NURSE PRACTITIONER

## 2020-01-01 PROCEDURE — 96365 THER/PROPH/DIAG IV INF INIT: CPT

## 2020-01-01 PROCEDURE — 99232 SBSQ HOSP IP/OBS MODERATE 35: CPT | Performed by: HOSPITALIST

## 2020-01-01 PROCEDURE — 80076 HEPATIC FUNCTION PANEL: CPT | Performed by: INTERNAL MEDICINE

## 2020-01-01 PROCEDURE — 12000011 ZZH R&B MS OVERFLOW

## 2020-01-01 PROCEDURE — 99238 HOSP IP/OBS DSCHRG MGMT 30/<: CPT | Performed by: INTERNAL MEDICINE

## 2020-01-01 PROCEDURE — 36415 COLL VENOUS BLD VENIPUNCTURE: CPT | Performed by: NURSE PRACTITIONER

## 2020-01-01 PROCEDURE — 72125 CT NECK SPINE W/O DYE: CPT | Mod: TC

## 2020-01-01 PROCEDURE — 40000185 ZZHC STATISTIC PT OUTPT VISIT

## 2020-01-01 PROCEDURE — 84132 ASSAY OF SERUM POTASSIUM: CPT | Performed by: INTERNAL MEDICINE

## 2020-01-01 PROCEDURE — 97162 PT EVAL MOD COMPLEX 30 MIN: CPT | Mod: GP | Performed by: PHYSICAL THERAPIST

## 2020-01-01 PROCEDURE — 25800030 ZZH RX IP 258 OP 636: Performed by: NURSE PRACTITIONER

## 2020-01-01 PROCEDURE — 40000996 ZZC STATISTIC SLP OP VISIT

## 2020-01-01 PROCEDURE — 83605 ASSAY OF LACTIC ACID: CPT | Performed by: NURSE PRACTITIONER

## 2020-01-01 PROCEDURE — 97165 OT EVAL LOW COMPLEX 30 MIN: CPT | Mod: GO

## 2020-01-01 RX ORDER — POTASSIUM CHLORIDE 1500 MG/1
20-40 TABLET, EXTENDED RELEASE ORAL
Status: DISCONTINUED | OUTPATIENT
Start: 2020-01-01 | End: 2020-01-01

## 2020-01-01 RX ORDER — SODIUM CHLORIDE 9 MG/ML
INJECTION, SOLUTION INTRAVENOUS CONTINUOUS
Status: DISCONTINUED | OUTPATIENT
Start: 2020-01-01 | End: 2020-01-01

## 2020-01-01 RX ORDER — PROCHLORPERAZINE MALEATE 5 MG
5 TABLET ORAL EVERY 6 HOURS PRN
Status: DISCONTINUED | OUTPATIENT
Start: 2020-01-01 | End: 2020-01-01 | Stop reason: HOSPADM

## 2020-01-01 RX ORDER — MAGNESIUM SULFATE HEPTAHYDRATE 40 MG/ML
4 INJECTION, SOLUTION INTRAVENOUS EVERY 4 HOURS PRN
Status: DISCONTINUED | OUTPATIENT
Start: 2020-01-01 | End: 2020-01-01

## 2020-01-01 RX ORDER — GABAPENTIN 300 MG/1
300 CAPSULE ORAL 3 TIMES DAILY
Status: DISCONTINUED | OUTPATIENT
Start: 2020-01-01 | End: 2020-01-01 | Stop reason: HOSPADM

## 2020-01-01 RX ORDER — DIVALPROEX SODIUM 250 MG/1
250 TABLET, DELAYED RELEASE ORAL EVERY 12 HOURS SCHEDULED
Status: DISCONTINUED | OUTPATIENT
Start: 2020-01-01 | End: 2020-01-01

## 2020-01-01 RX ORDER — ACETAMINOPHEN 325 MG/1
650 TABLET ORAL EVERY 4 HOURS PRN
Status: DISCONTINUED | OUTPATIENT
Start: 2020-01-01 | End: 2020-01-01 | Stop reason: HOSPADM

## 2020-01-01 RX ORDER — NICOTINE POLACRILEX 4 MG
15-30 LOZENGE BUCCAL
Status: DISCONTINUED | OUTPATIENT
Start: 2020-01-01 | End: 2020-01-01

## 2020-01-01 RX ORDER — DIVALPROEX SODIUM 250 MG/1
250 TABLET, DELAYED RELEASE ORAL AT BEDTIME
Status: DISCONTINUED | OUTPATIENT
Start: 2020-01-01 | End: 2020-01-01

## 2020-01-01 RX ORDER — PROCHLORPERAZINE 25 MG
12.5 SUPPOSITORY, RECTAL RECTAL EVERY 12 HOURS PRN
Status: DISCONTINUED | OUTPATIENT
Start: 2020-01-01 | End: 2020-01-01 | Stop reason: HOSPADM

## 2020-01-01 RX ORDER — TAMSULOSIN HYDROCHLORIDE 0.4 MG/1
0.4 CAPSULE ORAL DAILY
Status: DISCONTINUED | OUTPATIENT
Start: 2020-01-01 | End: 2020-01-01 | Stop reason: HOSPADM

## 2020-01-01 RX ORDER — CEFTRIAXONE SODIUM 1 G/50ML
1 INJECTION, SOLUTION INTRAVENOUS EVERY 24 HOURS
Status: DISCONTINUED | OUTPATIENT
Start: 2020-01-01 | End: 2020-01-01 | Stop reason: ALTCHOICE

## 2020-01-01 RX ORDER — GABAPENTIN 300 MG/1
300 CAPSULE ORAL 2 TIMES DAILY
DISCHARGE
Start: 2020-01-01 | End: 2020-01-01

## 2020-01-01 RX ORDER — POTASSIUM CHLORIDE 1.5 G/1.58G
20-40 POWDER, FOR SOLUTION ORAL
Status: DISCONTINUED | OUTPATIENT
Start: 2020-01-01 | End: 2020-01-01

## 2020-01-01 RX ORDER — DEXTROSE MONOHYDRATE 25 G/50ML
50 INJECTION, SOLUTION INTRAVENOUS ONCE
Status: COMPLETED | OUTPATIENT
Start: 2020-01-01 | End: 2020-01-01

## 2020-01-01 RX ORDER — NOREPINEPHRINE BITARTRATE 0.06 MG/ML
0.03-0.4 INJECTION, SOLUTION INTRAVENOUS CONTINUOUS
Status: DISCONTINUED | OUTPATIENT
Start: 2020-01-01 | End: 2020-01-01

## 2020-01-01 RX ORDER — MIRTAZAPINE 15 MG/1
30 TABLET, FILM COATED ORAL AT BEDTIME
Status: DISCONTINUED | OUTPATIENT
Start: 2020-01-01 | End: 2020-01-01 | Stop reason: HOSPADM

## 2020-01-01 RX ORDER — GABAPENTIN 300 MG/1
300 CAPSULE ORAL 3 TIMES DAILY
Qty: 90 CAPSULE | Refills: 1 | Status: SHIPPED | OUTPATIENT
Start: 2020-01-01

## 2020-01-01 RX ORDER — ONDANSETRON 2 MG/ML
4 INJECTION INTRAMUSCULAR; INTRAVENOUS EVERY 6 HOURS PRN
Status: DISCONTINUED | OUTPATIENT
Start: 2020-01-01 | End: 2020-01-01 | Stop reason: HOSPADM

## 2020-01-01 RX ORDER — POTASSIUM CHLORIDE 7.45 MG/ML
10 INJECTION INTRAVENOUS
Status: DISCONTINUED | OUTPATIENT
Start: 2020-01-01 | End: 2020-01-01

## 2020-01-01 RX ORDER — ACETAMINOPHEN 325 MG/1
650 TABLET ORAL EVERY 4 HOURS PRN
DISCHARGE
Start: 2020-01-01

## 2020-01-01 RX ORDER — MIRTAZAPINE 30 MG/1
30 TABLET, FILM COATED ORAL AT BEDTIME
DISCHARGE
Start: 2020-01-01

## 2020-01-01 RX ORDER — DEXTROSE MONOHYDRATE 25 G/50ML
25-50 INJECTION, SOLUTION INTRAVENOUS
Status: DISCONTINUED | OUTPATIENT
Start: 2020-01-01 | End: 2020-01-01

## 2020-01-01 RX ORDER — LIDOCAINE 40 MG/G
CREAM TOPICAL
Status: DISCONTINUED | OUTPATIENT
Start: 2020-01-01 | End: 2020-01-01 | Stop reason: HOSPADM

## 2020-01-01 RX ORDER — GABAPENTIN 300 MG/1
300 CAPSULE ORAL 2 TIMES DAILY
Status: DISCONTINUED | OUTPATIENT
Start: 2020-01-01 | End: 2020-01-01

## 2020-01-01 RX ORDER — CEFTRIAXONE SODIUM 1 G/50ML
1 INJECTION, SOLUTION INTRAVENOUS DAILY
Status: DISCONTINUED | OUTPATIENT
Start: 2020-01-01 | End: 2020-01-01

## 2020-01-01 RX ORDER — ONDANSETRON 4 MG/1
4 TABLET, ORALLY DISINTEGRATING ORAL EVERY 6 HOURS PRN
Status: DISCONTINUED | OUTPATIENT
Start: 2020-01-01 | End: 2020-01-01 | Stop reason: HOSPADM

## 2020-01-01 RX ORDER — DIVALPROEX SODIUM 500 MG/1
500 TABLET, EXTENDED RELEASE ORAL AT BEDTIME
Status: DISCONTINUED | OUTPATIENT
Start: 2020-01-01 | End: 2020-01-01

## 2020-01-01 RX ORDER — NALOXONE HYDROCHLORIDE 0.4 MG/ML
.1-.4 INJECTION, SOLUTION INTRAMUSCULAR; INTRAVENOUS; SUBCUTANEOUS
Status: DISCONTINUED | OUTPATIENT
Start: 2020-01-01 | End: 2020-01-01 | Stop reason: HOSPADM

## 2020-01-01 RX ORDER — DIVALPROEX SODIUM 500 MG/1
500 TABLET, EXTENDED RELEASE ORAL AT BEDTIME
DISCHARGE
Start: 2020-01-01 | End: 2020-01-01

## 2020-01-01 RX ORDER — POLYETHYLENE GLYCOL 3350 17 G/17G
17 POWDER, FOR SOLUTION ORAL DAILY PRN
Status: DISCONTINUED | OUTPATIENT
Start: 2020-01-01 | End: 2020-01-01 | Stop reason: HOSPADM

## 2020-01-01 RX ORDER — POTASSIUM CL/LIDO/0.9 % NACL 10MEQ/0.1L
10 INTRAVENOUS SOLUTION, PIGGYBACK (ML) INTRAVENOUS
Status: DISCONTINUED | OUTPATIENT
Start: 2020-01-01 | End: 2020-01-01 | Stop reason: RX

## 2020-01-01 RX ORDER — CEFTRIAXONE SODIUM 1 G/50ML
1 INJECTION, SOLUTION INTRAVENOUS ONCE
Status: COMPLETED | OUTPATIENT
Start: 2020-01-01 | End: 2020-01-01

## 2020-01-01 RX ORDER — GABAPENTIN 300 MG/1
300 CAPSULE ORAL AT BEDTIME
Status: DISCONTINUED | OUTPATIENT
Start: 2020-01-01 | End: 2020-01-01

## 2020-01-01 RX ADMIN — MIRTAZAPINE 30 MG: 15 TABLET, FILM COATED ORAL at 20:16

## 2020-01-01 RX ADMIN — DIVALPROEX SODIUM 250 MG: 250 TABLET, DELAYED RELEASE ORAL at 20:50

## 2020-01-01 RX ADMIN — MAGNESIUM SULFATE IN WATER 4 G: 40 INJECTION, SOLUTION INTRAVENOUS at 12:16

## 2020-01-01 RX ADMIN — GABAPENTIN 300 MG: 300 CAPSULE ORAL at 18:31

## 2020-01-01 RX ADMIN — SODIUM CHLORIDE, POTASSIUM CHLORIDE, SODIUM LACTATE AND CALCIUM CHLORIDE 500 ML: 600; 310; 30; 20 INJECTION, SOLUTION INTRAVENOUS at 19:28

## 2020-01-01 RX ADMIN — TAMSULOSIN HYDROCHLORIDE 0.4 MG: 0.4 CAPSULE ORAL at 09:00

## 2020-01-01 RX ADMIN — TAZOBACTAM SODIUM AND PIPERACILLIN SODIUM 4.5 G: 500; 4 INJECTION, SOLUTION INTRAVENOUS at 10:25

## 2020-01-01 RX ADMIN — MIRTAZAPINE 30 MG: 15 TABLET, FILM COATED ORAL at 20:49

## 2020-01-01 RX ADMIN — DEXTROSE AND SODIUM CHLORIDE: 5; 450 INJECTION, SOLUTION INTRAVENOUS at 19:46

## 2020-01-01 RX ADMIN — GABAPENTIN 300 MG: 300 CAPSULE ORAL at 21:11

## 2020-01-01 RX ADMIN — VANCOMYCIN HYDROCHLORIDE 1500 MG: 1 INJECTION, POWDER, LYOPHILIZED, FOR SOLUTION INTRAVENOUS at 20:26

## 2020-01-01 RX ADMIN — MICONAZOLE NITRATE: 2 POWDER TOPICAL at 13:01

## 2020-01-01 RX ADMIN — TAZOBACTAM SODIUM AND PIPERACILLIN SODIUM 4.5 G: 500; 4 INJECTION, SOLUTION INTRAVENOUS at 10:33

## 2020-01-01 RX ADMIN — MICONAZOLE NITRATE: 2 POWDER TOPICAL at 11:53

## 2020-01-01 RX ADMIN — TAMSULOSIN HYDROCHLORIDE 0.4 MG: 0.4 CAPSULE ORAL at 09:35

## 2020-01-01 RX ADMIN — DIVALPROEX SODIUM 250 MG: 250 TABLET, DELAYED RELEASE ORAL at 21:44

## 2020-01-01 RX ADMIN — Medication 0.03 MCG/KG/MIN: at 23:48

## 2020-01-01 RX ADMIN — DEXTROSE AND SODIUM CHLORIDE: 5; 450 INJECTION, SOLUTION INTRAVENOUS at 06:09

## 2020-01-01 RX ADMIN — MICONAZOLE NITRATE: 2 POWDER TOPICAL at 09:36

## 2020-01-01 RX ADMIN — CEFTRIAXONE SODIUM 1 G: 1 INJECTION, SOLUTION INTRAVENOUS at 08:09

## 2020-01-01 RX ADMIN — DEXTROSE AND SODIUM CHLORIDE: 5; 450 INJECTION, SOLUTION INTRAVENOUS at 19:44

## 2020-01-01 RX ADMIN — TAMSULOSIN HYDROCHLORIDE 0.4 MG: 0.4 CAPSULE ORAL at 08:06

## 2020-01-01 RX ADMIN — MIRTAZAPINE 30 MG: 15 TABLET, FILM COATED ORAL at 21:11

## 2020-01-01 RX ADMIN — TAZOBACTAM SODIUM AND PIPERACILLIN SODIUM 4.5 G: 500; 4 INJECTION, SOLUTION INTRAVENOUS at 16:37

## 2020-01-01 RX ADMIN — MICONAZOLE NITRATE: 2 POWDER TOPICAL at 20:42

## 2020-01-01 RX ADMIN — ENOXAPARIN SODIUM 40 MG: 40 INJECTION SUBCUTANEOUS at 17:42

## 2020-01-01 RX ADMIN — DEXTROSE MONOHYDRATE 50 ML: 500 INJECTION PARENTERAL at 18:24

## 2020-01-01 RX ADMIN — MIRTAZAPINE 30 MG: 15 TABLET, FILM COATED ORAL at 20:40

## 2020-01-01 RX ADMIN — POTASSIUM CHLORIDE 40 MEQ: 1.5 POWDER, FOR SOLUTION ORAL at 06:27

## 2020-01-01 RX ADMIN — GABAPENTIN 300 MG: 300 CAPSULE ORAL at 20:50

## 2020-01-01 RX ADMIN — TAMSULOSIN HYDROCHLORIDE 0.4 MG: 0.4 CAPSULE ORAL at 12:45

## 2020-01-01 RX ADMIN — DIVALPROEX SODIUM 250 MG: 250 TABLET, DELAYED RELEASE ORAL at 08:06

## 2020-01-01 RX ADMIN — SODIUM CHLORIDE: 9 INJECTION, SOLUTION INTRAVENOUS at 20:29

## 2020-01-01 RX ADMIN — SODIUM CHLORIDE 1000 ML: 9 INJECTION, SOLUTION INTRAVENOUS at 18:08

## 2020-01-01 RX ADMIN — DEXTROSE AND SODIUM CHLORIDE: 5; 450 INJECTION, SOLUTION INTRAVENOUS at 05:48

## 2020-01-01 RX ADMIN — DEXTROSE MONOHYDRATE 25 ML: 500 INJECTION PARENTERAL at 21:42

## 2020-01-01 RX ADMIN — GABAPENTIN 300 MG: 300 CAPSULE ORAL at 21:44

## 2020-01-01 RX ADMIN — MICONAZOLE NITRATE: 2 POWDER TOPICAL at 07:55

## 2020-01-01 RX ADMIN — TAZOBACTAM SODIUM AND PIPERACILLIN SODIUM 4.5 G: 500; 4 INJECTION, SOLUTION INTRAVENOUS at 22:25

## 2020-01-01 RX ADMIN — MIRTAZAPINE 30 MG: 15 TABLET, FILM COATED ORAL at 21:43

## 2020-01-01 RX ADMIN — GABAPENTIN 300 MG: 300 CAPSULE ORAL at 20:40

## 2020-01-01 RX ADMIN — CEFTRIAXONE SODIUM 1 G: 1 INJECTION, SOLUTION INTRAVENOUS at 14:23

## 2020-01-01 RX ADMIN — POTASSIUM CHLORIDE 20 MEQ: 1.5 POWDER, FOR SOLUTION ORAL at 10:40

## 2020-01-01 RX ADMIN — CEFTRIAXONE SODIUM 1 G: 1 INJECTION, SOLUTION INTRAVENOUS at 15:31

## 2020-01-01 RX ADMIN — GABAPENTIN 300 MG: 300 CAPSULE ORAL at 08:06

## 2020-01-01 RX ADMIN — TAZOBACTAM SODIUM AND PIPERACILLIN SODIUM 4.5 G: 500; 4 INJECTION, SOLUTION INTRAVENOUS at 04:09

## 2020-01-01 RX ADMIN — POTASSIUM CHLORIDE 10 MEQ: 7.46 INJECTION, SOLUTION INTRAVENOUS at 06:02

## 2020-01-01 RX ADMIN — DEXTROSE AND SODIUM CHLORIDE: 5; 450 INJECTION, SOLUTION INTRAVENOUS at 09:13

## 2020-01-01 RX ADMIN — PERFLUTREN 2 ML: 6.52 INJECTION, SUSPENSION INTRAVENOUS at 07:15

## 2020-01-01 RX ADMIN — DEXTROSE AND SODIUM CHLORIDE: 5; 450 INJECTION, SOLUTION INTRAVENOUS at 22:36

## 2020-01-01 RX ADMIN — GABAPENTIN 300 MG: 300 CAPSULE ORAL at 10:23

## 2020-01-01 RX ADMIN — TAZOBACTAM SODIUM AND PIPERACILLIN SODIUM 4.5 G: 500; 4 INJECTION, SOLUTION INTRAVENOUS at 04:52

## 2020-01-01 RX ADMIN — TAMSULOSIN HYDROCHLORIDE 0.4 MG: 0.4 CAPSULE ORAL at 10:23

## 2020-01-01 RX ADMIN — DIVALPROEX SODIUM 250 MG: 250 TABLET, DELAYED RELEASE ORAL at 21:11

## 2020-01-01 RX ADMIN — CEFTRIAXONE SODIUM 1 G: 1 INJECTION, SOLUTION INTRAVENOUS at 09:35

## 2020-01-01 RX ADMIN — MICONAZOLE NITRATE: 2 POWDER TOPICAL at 21:42

## 2020-01-01 RX ADMIN — GABAPENTIN 300 MG: 300 CAPSULE ORAL at 20:16

## 2020-01-01 RX ADMIN — ENOXAPARIN SODIUM 40 MG: 40 INJECTION SUBCUTANEOUS at 18:14

## 2020-01-01 RX ADMIN — DIVALPROEX SODIUM 250 MG: 250 TABLET, DELAYED RELEASE ORAL at 10:23

## 2020-01-01 RX ADMIN — Medication 0.03 MCG/KG/MIN: at 20:30

## 2020-01-01 RX ADMIN — TAZOBACTAM SODIUM AND PIPERACILLIN SODIUM 4.5 G: 500; 4 INJECTION, SOLUTION INTRAVENOUS at 21:45

## 2020-01-01 ASSESSMENT — ACTIVITIES OF DAILY LIVING (ADL)
ADLS_ACUITY_SCORE: 18
ADLS_ACUITY_SCORE: 18
ADLS_ACUITY_SCORE: 19
ADLS_ACUITY_SCORE: 18
ADLS_ACUITY_SCORE: 20
ADLS_ACUITY_SCORE: 19
ADLS_ACUITY_SCORE: 20
ADLS_ACUITY_SCORE: 19
ADLS_ACUITY_SCORE: 17
ADLS_ACUITY_SCORE: 16
ADLS_ACUITY_SCORE: 20
ADLS_ACUITY_SCORE: 18
ADLS_ACUITY_SCORE: 19
ADLS_ACUITY_SCORE: 18
ADLS_ACUITY_SCORE: 19
ADLS_ACUITY_SCORE: 18
ADLS_ACUITY_SCORE: 19
ADLS_ACUITY_SCORE: 18
ADLS_ACUITY_SCORE: 19
ADLS_ACUITY_SCORE: 20
ADLS_ACUITY_SCORE: 22
ADLS_ACUITY_SCORE: 19
ADLS_ACUITY_SCORE: 19
ADLS_ACUITY_SCORE: 20
ADLS_ACUITY_SCORE: 16
ADLS_ACUITY_SCORE: 22
ADLS_ACUITY_SCORE: 20
ADLS_ACUITY_SCORE: 22
ADLS_ACUITY_SCORE: 22
ADLS_ACUITY_SCORE: 19
ADLS_ACUITY_SCORE: 19
ADLS_ACUITY_SCORE: 18
ADLS_ACUITY_SCORE: 19
ADLS_ACUITY_SCORE: 19
ADLS_ACUITY_SCORE: 18
ADLS_ACUITY_SCORE: 22
ADLS_ACUITY_SCORE: 18
ADLS_ACUITY_SCORE: 18
ADLS_ACUITY_SCORE: 19
ADLS_ACUITY_SCORE: 18
ADLS_ACUITY_SCORE: 18
ADLS_ACUITY_SCORE: 19
ADLS_ACUITY_SCORE: 20
ADLS_ACUITY_SCORE: 19

## 2020-01-01 ASSESSMENT — ENCOUNTER SYMPTOMS
SHORTNESS OF BREATH: 0
ABDOMINAL PAIN: 0
HEADACHES: 0

## 2020-01-16 PROBLEM — E16.2 HYPOGLYCEMIA: Status: ACTIVE | Noted: 2020-01-01

## 2020-01-16 PROBLEM — G93.41 METABOLIC ENCEPHALOPATHY: Status: ACTIVE | Noted: 2020-01-01
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
## 2020-01-16 NOTE — ED TRIAGE NOTES
Pt brought in by EMS for evaluation of a fall. Meal delivery called police after pt had not picked up meal. On arrival EMS reported pt was found on the floor in the upper level of his home and the home did not have any heat. PIV was started by EMS and warmed saline infusing on arrival.

## 2020-01-16 NOTE — ED NOTES
, Joelle, at bedside with sister Estefany.  IV Rocephin started. NP Eloise, stated no blood cultures to be drawn at this time.

## 2020-01-16 NOTE — ED NOTES
EKG completed.  Labs collected blood.  Pt was incontinent of bowel and bladder, pt cleaned up.  UA collected and sent.  Rectal temp 98.9 Fahrenheit.  No doppler pedal pulse to left foot, but with doppler posteriorly, NP updated.  Pt has non-blanchable areas to buttox.

## 2020-01-16 NOTE — LETTER
[facility]  [address]  [place] [address] [phone]    FACSIMILE TRANSMITTAL SHEET    TO: [facility]   COMPANY:   FAX NUMBER: [phone]  PHONE NUMBER:      FROM: Joelle Lugo  PHONE: [phone]  DATE: 01/24/20  NUMBER OF PAGES:     _____URGENT _____REVIEW ONLY _____PLEASE COMMENT____PLEASE REPLY    NOTES/COMMENTS:                                       IF YOU DID NOT RECEIVE THE CORRECT NUMBER OF PAGES OR THE FAX DID NOT COME THROUGH CLEARLY, PLEASE CALL THE SENDER     CONFIDENTIALITY STATEMENT: Confidential information that may accompany this transmission contains protected health information under state and federal law and is legally privileged. This information is intended only for the use of the individual or entity named above and may be used only for carrying out treatment, payment or other healthcare operations. The recipient or person responsible for delivering this information is prohibited by law from disclosing this information without proper authorization to any other party, unless required to do so by law or regulation. If you are not the intended recipient, you are hereby notified that any review, dissemination, distribution, or copying of this message is strictly prohibited. If you have received this communication in error, please destroy the materials and contact us immediately by calling the number listed above. No response indicates that the information was received by the appropriate authorized party

## 2020-01-16 NOTE — PROGRESS NOTES
Care Transitions focused note:      Met with sister, Koko per request of Angely FLOYD.  Koko states that at this time Tim would not be able to return home.  She states that his heat was out but per the service man advised that his furnace had not been on for about a year.  She states that he was heating his house with space heaters.  She also states his lift chair is broken and that she believes is what resulted in his fall.  He receives meals on wheels and chore services.  Wheelchair bound at base line.

## 2020-01-16 NOTE — LETTER
[facility]  [address]  [place] [address] [phone]    FACSIMILE TRANSMITTAL SHEET    TO: [facility]    COMPANY: [facility]   FAX NUMBER: [phone]  PHONE NUMBER: [phone]     FROM: Joelle ALTAF Lugo  PHONE: [phone]  DATE: 01/21/20  NUMBER OF PAGES:     _____URGENT _____REVIEW ONLY _____PLEASE COMMENT____PLEASE REPLY    NOTES/COMMENTS:                                       IF YOU DID NOT RECEIVE THE CORRECT NUMBER OF PAGES OR THE FAX DID NOT COME THROUGH CLEARLY, PLEASE CALL THE SENDER     CONFIDENTIALITY STATEMENT: Confidential information that may accompany this transmission contains protected health information under state and federal law and is legally privileged. This information is intended only for the use of the individual or entity named above and may be used only for carrying out treatment, payment or other healthcare operations. The recipient or person responsible for delivering this information is prohibited by law from disclosing this information without proper authorization to any other party, unless required to do so by law or regulation. If you are not the intended recipient, you are hereby notified that any review, dissemination, distribution, or copying of this message is strictly prohibited. If you have received this communication in error, please destroy the materials and contact us immediately by calling the number listed above. No response indicates that the information was received by the appropriate authorized party

## 2020-01-16 NOTE — ED PROVIDER NOTES
History     Chief Complaint   Patient presents with     Fall     HPI  Tim Watson is a 69 year old male who presents on stretcher via EMS. [employer] was delivering a meal at his home and he did not answer. The police department was contacted to do a welfare check and found him laying on the floor on his upstairs level. The heat in his home was off - it is below zero temperatures outside today. He denies headache, chest pain, abdominal pian, shortness of breath. He is uncertain what day he fell.     He has a history of TBI, PTSD, R BKA - [facility]        Allergies:  Allergies   Allergen Reactions     Contrast Dye        Problem List:    Patient Active Problem List    Diagnosis Date Noted     Metabolic encephalopathy 01/16/2020     Priority: Medium     Hypoglycemia 01/16/2020     Priority: Medium     CAP (community acquired pneumonia) 07/09/2019     Priority: Medium        Past Medical History:    No past medical history on file.    Past Surgical History:    No past surgical history on file.    Family History:    No family history on file.    Social History:  Marital Status:   [4]  Social History     Tobacco Use     Smoking status: Current Every Day Smoker     Packs/day: 1.50     Years: 40.00     Pack years: 60.00     Smokeless tobacco: Never Used   Substance Use Topics     Alcohol use: No     Drug use: No        Medications:    No current outpatient medications on file.        Review of Systems   Respiratory: Negative for shortness of breath.    Cardiovascular: Negative for chest pain.   Gastrointestinal: Negative for abdominal pain.   Neurological: Negative for headaches.       Physical Exam   BP: 95/65  Pulse: (!) 122  Heart Rate: (!) 134  Temp: 98.9  F (37.2  C)  Resp: (!) 31  Height: (5ft 6)  Weight: 58.5 kg (128 lb 15.5 oz)  SpO2: 97 %      Physical Exam  Constitutional:       General: He is not in acute distress.     Comments: Chronically  ill appearing   HENT:      Head: Normocephalic. Laceration present. No raccoon eyes or Fermin's sign.        Right Ear: Tympanic membrane, ear canal and external ear normal. No hemotympanum.      Left Ear: Tympanic membrane, ear canal and external ear normal. No hemotympanum.      Nose: Nose normal.      Mouth/Throat:      Lips: Pink.      Mouth: Mucous membranes are dry.      Tongue: Tongue lesions: thick white coating.      Pharynx: Oropharynx is clear. Uvula midline.   Eyes:      General: Lids are normal.      Extraocular Movements: Extraocular movements intact.      Conjunctiva/sclera: Conjunctivae normal.   Neck:      Musculoskeletal: Full passive range of motion without pain.   Cardiovascular:      Rate and Rhythm: Regular rhythm. Tachycardia present.      Heart sounds: S1 normal and S2 normal. No murmur. No friction rub. No gallop.    Pulmonary:      Effort: Pulmonary effort is normal.      Breath sounds: Decreased breath sounds present.   Chest:      Chest wall: No deformity or tenderness.   Abdominal:      General: Bowel sounds are normal. There is no distension.      Palpations: Abdomen is soft.      Tenderness: There is no abdominal tenderness.   Genitourinary:     Comments: Excoriation to groin  Nursing reports nonblanchable areas to buttocks  Musculoskeletal:        Legs:    Skin:     General: Skin is warm and dry.      Comments: Upon arrival abdomen, chest warm to touch  Arms and right leg cold to tough   Neurological:      Mental Status: He is confused.      Comments: Somnolent  Intermittent conversation - answers year as 2019, uncertain month or day or where he is   Psychiatric:         Speech: Speech is slurred.         ED Course     ED Course as of Jan 16 2313   Thu Jan 16, 2020   1507 Bedside cardiac monitor showing sinus tachycardia with rate 103-109.  Eloise Hernandez CNP on 1/16/2020 at 3:08 PM        1508 Talked with sister who is at bedside. Patient's baseline is alert and oriented. [facility]. His cleaning lady was at his house on Tuesday. Today his Wednesday meals on wheel was still at the door so police were called. Sister reports that he has a lift in his house for his stairs but the lift is not working. She also states that the furnace was broke and the house was only 50 degrees inside. She does not feel like he is safe to go home at this time with his lift being broken. He has not mentioned any symptoms of chest pain, shortness of breath, headaches to her recently.  Eloise Hernandez CNP on 1/16/2020 at 3:09 PM        Procedures               EKG Interpretation:      Interpreted by Eloise Hernandez CNP  Time reviewed: 1400  Symptoms at time of EKG: altered mental status   Rhythm: atrial fibrillation  Rate: Tachycardia  ST Segments/ T Waves: no acute ischemic changes  Comparison to prior: Unchanged from 7/2019    Clinical Impression: no acute changes. History of paroxysmal atrial fibrillation        Results for orders placed or performed during the hospital encounter of 01/16/20 (from the past 24 hour(s))   UA reflex to Microscopic and Culture   Result Value Ref Range    Color Urine Yellow     Appearance Urine Cloudy     Glucose Urine Negative NEG^Negative mg/dL    Bilirubin Urine Negative NEG^Negative    Ketones Urine 40 (A) NEG^Negative mg/dL    Specific Gravity Urine 1.019 1.003 - 1.035    Blood Urine Large (A) NEG^Negative    pH Urine 5.5 4.7 - 8.0 pH    Protein Albumin Urine 100 (A) NEG^Negative mg/dL    Urobilinogen mg/dL Normal 0.0 - 2.0 mg/dL    Nitrite Urine Negative NEG^Negative    Leukocyte Esterase Urine Large (A) NEG^Negative    Source Midstream Urine     RBC Urine 148 (H) 0 - 2 /HPF    WBC Urine >182 (H) 0 - 5 /HPF    WBC Clumps Present (A) NEG^Negative /HPF    Bacteria Urine None (A) NEG^Negative /HPF    Squamous Epithelial /HPF Urine 1 0 - 1 /HPF   Procalcitonin   Result Value Ref Range    Procalcitonin 0.08 ng/ml   CBC with platelets  differential   Result Value Ref Range    WBC 16.7 (H) 4.0 - 11.0 10e9/L    RBC Count 5.59 4.4 - 5.9 10e12/L    Hemoglobin 16.3 13.3 - 17.7 g/dL    Hematocrit 48.3 40.0 - 53.0 %    MCV 86 78 - 100 fl    MCH 29.2 26.5 - 33.0 pg    MCHC 33.7 31.5 - 36.5 g/dL    RDW 13.6 10.0 - 15.0 %    Platelet Count 365 150 - 450 10e9/L    Diff Method Automated Method     % Neutrophils 77.8 %    % Lymphocytes 10.6 %    % Monocytes 10.6 %    % Eosinophils 0.0 %    % Basophils 0.4 %    % Immature Granulocytes 0.6 %    Nucleated RBCs 0 0 /100    Absolute Neutrophil 13.0 (H) 1.6 - 8.3 10e9/L    Absolute Lymphocytes 1.8 0.8 - 5.3 10e9/L    Absolute Monocytes 1.8 (H) 0.0 - 1.3 10e9/L    Absolute Eosinophils 0.0 0.0 - 0.7 10e9/L    Absolute Basophils 0.1 0.0 - 0.2 10e9/L    Abs Immature Granulocytes 0.1 0 - 0.4 10e9/L    Absolute Nucleated RBC 0.0    Comprehensive metabolic panel   Result Value Ref Range    Sodium 142 133 - 144 mmol/L    Potassium 3.6 3.4 - 5.3 mmol/L    Chloride 106 94 - 109 mmol/L    Carbon Dioxide 24 20 - 32 mmol/L    Anion Gap 12 3 - 14 mmol/L    Glucose 72 70 - 99 mg/dL    Urea Nitrogen 35 (H) 7 - 30 mg/dL    Creatinine 0.81 0.66 - 1.25 mg/dL    GFR Estimate >90 >60 mL/min/[1.73_m2]    GFR Estimate If Black >90 >60 mL/min/[1.73_m2]    Calcium 8.8 8.5 - 10.1 mg/dL    Bilirubin Total 1.0 0.2 - 1.3 mg/dL    Albumin 3.1 (L) 3.4 - 5.0 g/dL    Protein Total 7.3 6.8 - 8.8 g/dL    Alkaline Phosphatase 117 40 - 150 U/L    ALT 24 0 - 70 U/L    AST 34 0 - 45 U/L   Lactic acid whole blood   Result Value Ref Range    Lactic Acid 1.8 0.7 - 2.0 mmol/L   Nt probnp inpatient (BNP)   Result Value Ref Range    N-Terminal Pro BNP Inpatient 8,331 (H) 0 - 900 pg/mL   CK total   Result Value Ref Range    CK Total 584 (H) 30 - 300 U/L   CT Head w/o Contrast    Narrative    PROCEDURE: CT HEAD W/O CONTRAST 1/16/2020 3:03 PM    HISTORY: Altered level of consciousness (LOC), unexplained; head  trauma    COMPARISONS: 7/14/2019.    Meds/Dose Given:  None.    TECHNIQUE: Axial noncontrast enhanced images with coronal and sagittal  reformatted images.    FINDINGS: There is mild generalized atrophy. There is an old infarct  in the right middle cerebral artery distribution. This is very similar  to the prior exam.    Small vessel ischemic changes are seen in the periventricular white  matter.    No mass, midline shift or extra-axial fluid collection is seen. There  is no focal hemorrhage.    Bone windows show no acute fracture. Visualized paranasal sinuses and  mastoid air cells are clear.         Impression    IMPRESSION: No acute mass effect or hemorrhage. Small vessel ischemic  changes with old infarct in the right MCA distribution.    MAINE OROSCO MD   Cervical spine CT w/o contrast    Narrative    PROCEDURE: CT CERVICAL SPINE W/O CONTRAST 1/16/2020 3:04 PM    HISTORY: fall, head injury    COMPARISONS: None.    Meds/Dose Given: None.    TECHNIQUE: Axial noncontrast enhanced images with coronal and sagittal  reformatted images.    FINDINGS: No fracture or malalignment is seen. There is no  prevertebral soft tissue swelling. There is some straightening of the  normal cervical lordosis, a nonspecific finding which can be seen with  muscle spasm.    There is degenerative disc disease most severe at the C6-7 and C7-T1  levels. Disc space narrowing and subchondral sclerosis are seen with  small spurs. Some degenerative changes seen at C5-6 as well.    There is a small lucent lesion within the right side of the C3  vertebral body, nonspecific.         Impression    IMPRESSION: Degenerative change without acute fracture.    Lytic lesion in the C3 vertebral body, nonspecific. If the patient has  a known primary malignancy MRI might be considered.    MAINE OROSCO MD   CT Chest Abdomen Pelvis w/o Contrast    Addendum: 1/16/2020    Regarding the fracture deformity in the right inferior pubic ramus, it  is likely an old injury with callus formation about the  fracture  deformity.    If clinical concern exists, MRI examination may be helpful in  assessing for an acute/subacute component.    NAS GRACE MD      Narrative    CT CHEST ABDOMEN PELVIS W/O CONTRAST    CLINICAL HISTORY: Male, age 69 years, loss of consciousness, fall;    Comparison:  None.    TECHNIQUE:  CT was performed of the chest, abdomen and pelvis without  contrast.   Sagittal, coronal and axial reconstructions were reviewed.     FINDINGS:  Chest CT:   Lungs : Mild emphysema. No acute abnormality.  Thyroid: The visualized portions are normal.  Heart and Great Vessels:  Atherosclerotic calcifications. No acute  abnormality.  Lymph Nodes:  Normal.  Pleura: Normal.  Bony Structures:  Healed versus healing fracture of the left clavicle.  No acute fracture. Sternum is intact.  Esophagus: Normal.    Abdomen/Pelvis CT:  Stomach and duodenum: Normal.  Liver:  Normal.  Gallbladder: Radiodense gallstones.  Spleen: Normal.  Pancreas: Normal.  Adrenal Glands: Normal.  Kidneys: Large staghorn calculi of the renal pelves. Numerous  radiodense calculi in each kidney.  Ureters: Grossly normal.  Abdominal Aorta: Scattered atherosclerotic calcifications.  IVC: Normal.  Lymph Nodes: Normal.  Urinary bladder: There is heterogeneous wall thickening of the urinary  bladder with numerous diverticula throughout.  Large and Small Bowel: Diverticulosis of the distal colon. No acute  abnormality. The appendix is not seen.  Pelvic Organs:  Prostate hypertrophy.  Peritoneum: Normal.  Bony structures: Partially united fracture involving the right  inferior pubic ramus. No evidence of an acute fracture. Degenerative  changes of the SI joints, lumbar spine and hip joints.    Other Findings:  Inguinal lymph nodes are normal.      Impression    IMPRESSION:   No evidence of acute abnormality within the chest, abdomen or pelvis.  No fracture. Solid organs are intact.    Partially united fracture involving the right inferior pubic  ramus.    Cholelithiasis.    Prostate hypertrophy with heterogeneous thickening of the urinary  bladder walls and numerous urinary bladder diverticula.    Numerous chronic findings as described above including large bilateral  staghorn calculi and numerous bilateral radiodense renal stones.    NAS GRACE MD   Glucose by meter   Result Value Ref Range    Glucose 65 (L) 70 - 99 mg/dL   CBC with platelets   Result Value Ref Range    WBC 15.8 (H) 4.0 - 11.0 10e9/L    RBC Count 4.80 4.4 - 5.9 10e12/L    Hemoglobin 14.0 13.3 - 17.7 g/dL    Hematocrit 42.2 40.0 - 53.0 %    MCV 88 78 - 100 fl    MCH 29.2 26.5 - 33.0 pg    MCHC 33.2 31.5 - 36.5 g/dL    RDW 13.6 10.0 - 15.0 %    Platelet Count 293 150 - 450 10e9/L   Lactic acid whole blood   Result Value Ref Range    Lactic Acid 1.5 0.7 - 2.0 mmol/L   Basic metabolic panel   Result Value Ref Range    Sodium 142 133 - 144 mmol/L    Potassium 3.8 3.4 - 5.3 mmol/L    Chloride 109 94 - 109 mmol/L    Carbon Dioxide 25 20 - 32 mmol/L    Anion Gap 8 3 - 14 mmol/L    Glucose 215 (H) 70 - 99 mg/dL    Urea Nitrogen 34 (H) 7 - 30 mg/dL    Creatinine 0.78 0.66 - 1.25 mg/dL    GFR Estimate >90 >60 mL/min/[1.73_m2]    GFR Estimate If Black >90 >60 mL/min/[1.73_m2]    Calcium 8.0 (L) 8.5 - 10.1 mg/dL   Blood culture   Result Value Ref Range    Specimen Description Blood     Special Requests 5ml peds     Culture Micro PENDING    Glucose by meter   Result Value Ref Range    Glucose 161 (H) 70 - 99 mg/dL   Glucose by meter   Result Value Ref Range    Glucose 68 (L) 70 - 99 mg/dL   Glucose by meter   Result Value Ref Range    Glucose 90 70 - 99 mg/dL       Medications   lidocaine 1 % 0.1-1 mL (has no administration in time range)   lidocaine (LMX4) kit (has no administration in time range)   sodium chloride (PF) 0.9% PF flush 3 mL (has no administration in time range)   sodium chloride (PF) 0.9% PF flush 3 mL (3 mLs Intracatheter Given 1/16/20 1812)   naloxone (NARCAN) injection 0.1-0.4  mg (has no administration in time range)   melatonin tablet 1 mg (has no administration in time range)   enoxaparin ANTICOAGULANT (LOVENOX) injection 40 mg (40 mg Subcutaneous Given 1/16/20 1814)   potassium chloride ER (K-DUR/KLOR-CON M) CR tablet 20-40 mEq (has no administration in time range)   potassium chloride (KLOR-CON) Packet 20-40 mEq (has no administration in time range)   potassium chloride 10 mEq in 100 mL sterile water intermittent infusion (premix) (has no administration in time range)   magnesium sulfate 4 g in 100 mL sterile water (premade) (has no administration in time range)   acetaminophen (TYLENOL) tablet 650 mg (has no administration in time range)   polyethylene glycol (MIRALAX/GLYCOLAX) Packet 17 g (has no administration in time range)   ondansetron (ZOFRAN-ODT) ODT tab 4 mg (has no administration in time range)     Or   ondansetron (ZOFRAN) injection 4 mg (has no administration in time range)   prochlorperazine (COMPAZINE) injection 5 mg (has no administration in time range)     Or   prochlorperazine (COMPAZINE) tablet 5 mg (has no administration in time range)     Or   prochlorperazine (COMPAZINE) Suppository 12.5 mg (has no administration in time range)   glucose gel 15-30 g ( Oral See Alternative 1/16/20 2142)     Or   dextrose 50 % injection 25-50 mL (25 mLs Intravenous Given 1/16/20 2142)     Or   glucagon injection 1 mg ( Subcutaneous See Alternative 1/16/20 2142)   insulin aspart (NovoLOG) inj (RAPID ACTING) (0.5 Units Subcutaneous Not Given 1/16/20 1827)   insulin aspart (NovoLOG) inj (RAPID ACTING) (0.5 Units Subcutaneous Not Given 1/16/20 2139)   norepinephrine (LEVOPHED) 16 mg in  mL infusion ( Intravenous Canceled Entry 1/16/20 2037)   piperacillin-tazobactam (ZOSYN) intermittent infusion 4.5 g (4.5 g Intravenous New Bag 1/16/20 2225)   vancomycin (VANCOCIN) 1,500 mg in sodium chloride 0.9 % 500 mL intermittent infusion (1,500 mg Intravenous New Bag 1/16/20 2026)   dextrose  5% and 0.45% NaCl infusion ( Intravenous New Bag 1/16/20 2236)   0.9% sodium chloride BOLUS (1,000 mLs Intravenous New Bag 1/16/20 1808)   cefTRIAXone in d5w (ROCEPHIN) intermittent infusion 1 g (0 g Intravenous Stopped 1/16/20 1600)   dextrose 50 % injection 50 mL (50 mLs Intravenous Given 1/16/20 1824)   lactated ringers BOLUS 500 mL (500 mLs Intravenous New Bag 1/16/20 1928)       Assessments & Plan (with Medical Decision Making)     I have reviewed the nursing notes.    I have reviewed the findings, diagnosis, plan and need for follow up with the patient.  69-year old male presents via EMS after a welfare check. Uncertain how long he had been in his home but the heat was off and temperatures today are below zero. There is concerns for fall and head injury as patient has an abrasion on his left upper eye brow. Nursing reported that he said he fell going up the stairs and loss consciousness. Head CT, cervical spine CT obtained - these were normal. Given unwitnessed fall and unknown injuries chest/abdomen/pelvic CT - There was question of acute versus old fracture of right inferior pubic ramus - radiology thinks likely old unless there is clinical correlation. At this time patient denies pain; however, compared to his baseline reported to sister he does have altered mental status. CK elevated, BNP elevated, upon arrival EKG showed atrial fibrillation with RVR up to 133 but bedside cardiac monitor showed he converted to sinus rhythm with rate in the 90s-100s. Leukocytosis and UA positive for infection - ceftriaxone given.     Consulted with hospitalist on admission. Patient to be admitted for further evaluation/management.       Current Discharge Medication List          Final diagnoses:   Altered mental status, unspecified altered mental status type   Fall, initial encounter   Injury of head, initial encounter   Urinary tract infection   Paroxysmal atrial fibrillation (H)     Eloise Hernandez, JOHANNY  1/16/2020   [facility]  EMERGENCY DEPARTMENT     Eloise Hernandez, JOHANNY  01/16/20 2317       Eloise Hernandez, JOHANNY  01/16/20 2310

## 2020-01-17 NOTE — PLAN OF CARE
See transfer note for interventions upon transfer to ICU. Pt A&O x4, Bps remain labile, MAP goal is 65, currently on 0.03/mcg/kg/min. Sats good on 1L NC. Pt denies any pain. Tele reading SR to Sinus tach with no ectopy. Lungs are clear but diminished, HRR. Pt denies any pain. D5 1/2 NS running at 100MLs/hr and IV vanco and zosyn in use. Pt was able to get some rest and looks much better than when transferred. Pt able to take in liquids without difficulty, did eat some pudding also. Pt has been afebrile. [place] placed for strict I&O. Scattered scabs and blanchable redness noted on bottom. No other significant events, will continue to monitor.

## 2020-01-17 NOTE — PLAN OF CARE
Prior to Admission Medication Reconciliation:     Medications added:   [] None  [x] As listed below:    Cinnamon 500mg- pt reported, [facility]    campho-phenique: pt reported [facility]    Medications deleted:   [] None  [x] As listed below:    Asa- pt reports hasn't been taking    Nicotine patches- pt reports doesn't have and hasn't been taking    Changes made to existing medications:   [] None  [x] As listed below:    Patient is on Abilify 2.5 mg- reports he stopped taking on his own over a month ago, his sister stated he was doing really well on it. [facility]    Patient hasn't been taking his bupropion for several months. Stated he didn't want to quit smoking. Last filled 9/27/2019 for a 90 ds.     Patient has only been taking 1 tablet of his mirtazapine at night and stated that 30 mg was enough. Recently ran out, refilled [facility] on 1/13/20     Last times/dates taken verified with patient:  [x] Yes- completed myself: patient may have run out of his tamsulosin and mirtazapine and was unable to tell me exact times he last took them. They were both recently ref[facility].   [] Nurse completed no changes made  [] Unable to review with patient:  [] Nurse completed/changes made:       Allergy modifications:    []Did not review  []Patient verified NKA  [x]Patient verified current existing allergies: no changes made  []New allergies: listed below    Medication reconciliation sources:   [x]Patient  []Patient family member/emergency contact: **  [x][facility] Report Review  : 2008 records. Nothing recent  []Epic Chart Review  []Care Everywhere review  [x]Pharmacy med list: [facility] med list  []Pharmacy phone call  []Outside meds dispense report  []Nursing home MAR:  []Other: **    Is patient on coumadin?  [x]No    Time spent on medication reconciliation:   []5-20 mins  [x]20-40 mins  []> 40 mins    Discrepancies: [x] No  []Yes: listed below    Requests for consultation by provider or  pharmacist:   [x] Patient understands why all of their meds were prescribed and how to take them. No questions.   [] Fill dates coincide with compliancy for all maintenance meds.   [] Fill dates do not coincide with compliancy with maintenance meds. See notes in PTA medlist about how patient is taking.   [] Patient has questions about the following:    Comments: see notes above.    Valentina Donahue on 1/17/2020 at 11:21 AM     Issues completing PTA medication reconciliation:  [] On hold for a long time  [] Waited for a call back  [] Fax didn't come through  [] Fax took a long time  [] Other:

## 2020-01-17 NOTE — PLAN OF CARE
Face to face report given with opportunity to observe patient.    Report given to MARIEL Fierro   1/17/2020  6:57 AM

## 2020-01-17 NOTE — PROGRESS NOTES
CLINICAL NUTRITION SERVICES  -  ASSESSMENT NOTE    Tim Mtzseng : Admission Nutrition Risk Screen - reduced oral intake    69 yom admitted for metabolic encephalopathy. Pt has a hx of TBI and above knee amputation. Noted 12lb weight loss since July of 2019. Weight loss due to reduced intake. Spoke with pt's sister. Pt's diet consists of a lot of soda and convienece foods. He does receive meals on wheels but may not be eating them very often. He used to drink many Ensure supplements per day. Will send Ensure TID. Family does the grocery shopping for him.    Diet Order: Regular Diet  Intake: none documented at this time    Height: 5ft 9in  Weight: 128 lbs 11.98 oz  Body mass index is 19.01 kg/m .  Weight Status: Within normal IBWR  IBWR: 111-148lb- for AKA  Weight History: 8.5% weight loss in 6 months- doesn't meet criteria for malnutrition  Wt Readings from Last 10 Encounters:   01/17/20 58.4 kg (128 lb 12 oz)   07/16/19 63.6 kg (140 lb 3.4 oz)     Estimated Energy Needs: 2930-5176 kcals (30-35 Kcal/Kg)   Estimated Protein Needs: 60-70 grams protein (1-1.2 g pro/Kg)    Malnutrition Diagnosis: Patient does not meet two of the criteria necessary for diagnosing malnutrition at this time. High risk    NUTRITION RECOMMENDATIONS  - Will send Ensure TID    MONITORING AND EVALUATION:  RD will monitor intake, weight, labs

## 2020-01-17 NOTE — PLAN OF CARE
[facility] Inpatient Admission Note:    Patient admitted to 3122/3122-1 at approximately 1800 via cart accompanied by sister from emergency room . Report received from Angely FLOYD in SBAR format at 1630 via telephone. Patient transferred to bed via slide board.. Patient is alert and oriented X 2, denies pain; rates at 0 on 0-10 scale.  Patient oriented to room, unit, hourly rounding, and plan of care. Explained admission packet and patient handbook with patient bill of rights brochure. Will continue to monitor and document as needed.     Inpatient Nursing criteria listed below was met:    Health care directives status obtained and documented: Yes    Care Everywhere authorization obtained No    MRSA swab completed for patient 65 years and older: pending    Patient identifies a surrogate decision maker: Yes If yes, who:sister Koko  Contact Information:see facesheet     If initial lactic acid >2.0, repeat lactic acid drawn within one hour of arrival to unit: Yes. If no, state reason: drawn after admission    Vaccination assessment and education completed: Yes   Vaccinations received prior to admission: Pneumovax no  Influenza(seasonal)  NO   Vaccination(s) ordered: not given today because patient declines    Clergy visit ordered if patient requests: Yes    Skin issues/needs documented: Yes    Isolation Patient: no Education given, correct sign in place and documentation row added to PCS:  No    Fall Prevention Yes: Care plan updated, education given and documented, sticker and magnet in place: Yes    Care Plan initiated: Yes    Education Documented (including assessment): Yes    Patient has discharge needs : No If yes, please explain:possible discharge to a facility.

## 2020-01-17 NOTE — SIGNIFICANT EVENT
Significant Event Note    Time of event: 7:06 PM January 16, 2020    Description of event:  Pt is developing hypotension at 78/54.  He may be developing fulminant sepsis with hypotension.    Plan:  Will transfer to ICU  place [place] for strict I&O  Blood culture x 2  Broaden abx to vanco and Zosyn IV  IVF resuscitation  In light of elevated BNP, pt may be in CHF as well; will start pressors to avoid fluid overload.    Discussed with: patient's family/emergency contact and bedside nurse    Billy Sy MD

## 2020-01-17 NOTE — PHARMACY-VANCOMYCIN DOSING SERVICE
Pharmacy Vancomycin Initial Note  Date of Service 2020  Patient's  1950  69 year old, male    Indication: Sepsis    Current estimated CrCl = Estimated Creatinine Clearance: 76 mL/min (based on SCr of 0.78 mg/dL).    Creatinine for last 3 days  2020:  1:58 PM Creatinine 0.81 mg/dL;  6:39 PM Creatinine 0.78 mg/dL    Recent Vancomycin Level(s) for last 3 days  No results found for requested labs within last 72 hours.      Vancomycin IV Administrations (past 72 hours)      No vancomycin orders with administrations in past 72 hours.                Nephrotoxins and other renal medications (From now, onward)    Start     Dose/Rate Route Frequency Ordered Stop    20 193  vancomycin (VANCOCIN) 1,500 mg in sodium chloride 0.9 % 500 mL intermittent infusion      1,500 mg  over 2 Hours Intravenous ONCE 20 19220 191  norepinephrine (LEVOPHED) 16 mg in  mL infusion      0.03-0.4 mcg/kg/min × 58.5 kg  1.6-21.9 mL/hr  Intravenous CONTINUOUS 20 19020 191  piperacillin-tazobactam (ZOSYN) intermittent infusion 4.5 g      4.5 g  200 mL/hr over 30 Minutes Intravenous EVERY 6 HOURS 20 190            Contrast Orders - past 72 hours (72h ago, onward)    None                Plan:  1.  Start vancomycin  1500 mg IV q18h.   2.  Goal Trough Level: 15-20 mg/L   3.  Pharmacy will check trough levels as appropriate in 1-3 Days.    4. Serum creatinine levels will be ordered daily for the first week of therapy and at least twice weekly for subsequent weeks.    5. [place] method utilized to dose vancomycin therapy: [facility]    Gage Carpio, Pharm D.

## 2020-01-17 NOTE — PHARMACY-VANCOMYCIN DOSING SERVICE
Pharmacy consulted to dose vancomycin for a one time dose. If further vancomycin therapy is desired, please submit consult for pharmacy to dose as inpatient.    Gage Carpio Pharm D.

## 2020-01-17 NOTE — PLAN OF CARE
Due to the great number of skin issues, this writer is documenting skin issues in this format, on admission, the buttocks and homar area is red,blanchable, excoriated and denuded, barrier cream is placed, patient was incontinent of a large formed stool, there is a scab noted over the left eye, the left leg is full of scrapes and abrasions, there is a large blister noted to the foot neat the top by the great toe,  patient is lethargic, does rouse with prompting, the hands are cyanotic, and lynnette patient has reported feeling thirsty, and denies pain. Patients sister Koko is here, patient unaware of his medications, sister is also unaware of his home medications, [facility], we are not able to obtain the list at present time, MD aware. Patient did state he wishes to be a DNR DNI.

## 2020-01-17 NOTE — PLAN OF CARE
[facility] Inpatient Admission Note:    Patient admitted to 3122/3122-1 at approximately 2000 via bed accompanied by nurse from Med/Surg . Report received from MARIEL Crisostomo in SBAR format at 2000 via face to face in room. Patient in bed. Patient is alert and oriented X 3, denies pain; rates at 0 on 0-10 scale.  Patient oriented to room, unit, hourly rounding, and plan of care. Explained admission packet and patient handbook with patient bill of rights brochure. Will continue to monitor and document as needed.       Pt transferred to ICU due to low blood pressures. Upon arrival Bps were stable, but labile. 1L bolus of NS was given along with a 500ML bolus of LR. HR decreased and Bps improved for a short time. Levophed drip was started at 0.03MCG/KG/HR to maintain a MAP greater than 65. D5 1/2 NS running at 100MLs/hr. IV zosyn and vanco in use.   [place] placed. Pt looks much better than when he arrived and is currently resting comfortably.

## 2020-01-17 NOTE — PROGRESS NOTES
Inpatient Occupational Therapy Evaluation    Name: Tim Watson MRN# [id number]   Age: 69 year old YOB: 1950     Date of Consultation: 2020  Consultation is requested by:  Dr. Rivera  Specific Consultation Request:  ADLs  Primary care provider: Ever Bergman    General Information:   Onset Date: 2020    History of Current Problem/Admission: Paroxysmal atrial fibrillation (H) [I48.0]  Urinary tract infection [N39.0]  Injury of head, initial encounter [S09.90XA]  Fall, initial encounter [W19.XXXA]  Altered mental status, unspecified altered mental status type [R41.82]    Prior Level of Function: Pt reports he was independent with ADLs at home, including dressing, bathing, and toileting. He mainly uses a wheelchair for functional mobility, but is able to perform stand pivot transfers (per pt's sister). Pt doesn't use a walker when transferring.   Ambulation: 1 - Assistive Equipment (wheelchair)   Transferrin - Independent   Toiletin - Assistive Equipment    Bathin - Assistive Equipment   Dressin - Independent   Eatin - Independent   Communication: 2 - difficulty with understanding (not related to language barrier)  Cognition: 1 - attention or memory deficits    Fall history within the last 6 months: Yes - pt had a fall resulting in hospitalization. Pt had another fall back in July     Current Living Situation: Pt lives alone in a split level house. He enters in the lower level as there is a small ramp entry (the other entrance has steps to enter.) Pt has a chair lift to bring him up to the second level, however, it is broke. Pt's bedroom, laundry, and bathroom are on the lower level, and the kitchen is upstairs. Pt's bathroom has a regular height toilet with grab bars and a walk in shower with grab bars and a shower chair.     Current Equipment Used at Home: Wheelchair, chair lift (broke), grab bars near toilet and in shower, small ramp entry      Patient  & Family Goals: Pt did not verbalize       Past Medical History:   No past medical history on file.    Past Surgical History:  No past surgical history on file.    Medications:   Current Facility-Administered Medications   Medication     acetaminophen (TYLENOL) tablet 650 mg     dextrose 5% and 0.45% NaCl infusion     glucose gel 15-30 g    Or     dextrose 50 % injection 25-50 mL    Or     glucagon injection 1 mg     enoxaparin ANTICOAGULANT (LOVENOX) injection 40 mg     insulin aspart (NovoLOG) inj (RAPID ACTING)     insulin aspart (NovoLOG) inj (RAPID ACTING)     lidocaine (LMX4) kit     lidocaine 1 % 0.1-1 mL     magnesium sulfate 4 g in 100 mL sterile water (premade)     melatonin tablet 1 mg     miconazole (MICATIN/MICRO GUARD) 2 % powder     naloxone (NARCAN) injection 0.1-0.4 mg     norepinephrine (LEVOPHED) 16 mg in  mL infusion     ondansetron (ZOFRAN-ODT) ODT tab 4 mg    Or     ondansetron (ZOFRAN) injection 4 mg     piperacillin-tazobactam (ZOSYN) intermittent infusion 4.5 g     polyethylene glycol (MIRALAX/GLYCOLAX) Packet 17 g     potassium chloride (KLOR-CON) Packet 20-40 mEq     potassium chloride 10 mEq in 100 mL sterile water intermittent infusion (premix)     potassium chloride ER (K-DUR/KLOR-CON M) CR tablet 20-40 mEq     prochlorperazine (COMPAZINE) injection 5 mg    Or     prochlorperazine (COMPAZINE) tablet 5 mg    Or     prochlorperazine (COMPAZINE) Suppository 12.5 mg     sodium chloride (PF) 0.9% PF flush 3 mL     sodium chloride (PF) 0.9% PF flush 3 mL       Weight Bearing Status: FWB     Cognitive Status Examination:  Orientation: oriented to time, place and person  Level of Consciousness: alert  Follows Commands and Answers Questions: 75% of the time  Personal Safety and Judgement: Impaired  Memory: Immediate recall intact  Comments: Pt has a h/o TBI    Pain:   Currently in pain? No  Pain Location?   Pain Ratin (No pain)    Occupational Therapy Evaluation:   Integumentary/Edema:   Homar-area red, scar over left eye, left leg full of scrapes/abrasions, blister on foot   Range of Motion: WFL   Strength: BUE's grossly 4/5, generalized weakness ntoed  Hand Strength: bilateral gross grasp fair  Muscle Tone Assessment: no issues observed  Coordination: no significant findings    Mobility:   Bed Mobility: Pt was able to roll onto his left side with Giovana. He needed ModA to transfer side lying to sitting EOB  Transfer Skills: Min-modA x2 sit to stand. Verbal cues and Giovana stand to sit  Bed to Chair/Chair to Bed: Min-modA x2   Balance: Fair with walker      ADLs:   Upper Body Dressing: MaxA to don clean gown  Lower Body Dressing: MaxA to doff brieds and don clean pull ups, MaxA to don sock  Toileting: TotalA to perform homar-cares, pt incontinent of BM    IADLs:   Previous Responsibilities of the Patient: Meal Prep, Laundry, Medication Management and Finances   Comments: Pt does very little meal prep. He gets meals on wheels once a day, 7 days/week. Pt's sister concerned about his medications, as some were missing. Pt has a housekeeping once a week. Pt does not drive, family transport. Family shops for pt. He hires out for yard work.     Activities of Daily Living Analysis:   Impairments Contributing to Impaired Activities of Daily Living: Balance impaired , Cognition (safety) impaired , strength decreased and activity tolerance decreased    Occupational Therapy Interventions: ADL Retraining , cognition , strengthening , progressive activity/exercise and risk factor education     Clinical Impressions:  Criteria for Skilled Therapeutic Intervention Met: Yes, treatment indicated  OT Diagnosis: Impaired ADLs  Influenced by the following impairments: Impaired balance, impaired cognition including safety, weakness, and decreased activity tolerance  Functional limitations due to impairment: Dressing, grooming, bathing, toileting, functional mobility   Clinical presentation: Stable/Uncomplicated  Clinical  presentation rationale: Clinical judgement   Clinical Decision making (complexity): Low Complexity  Frequency: 5 times/week  Predicted Duration of Therapy Intervention (days/wks): 5 days  Anticipated Discharge Disposition: Transitional Care Facility   Anticipated Equipment Needs at Discharge: TBD  Risks and Benefits of therapy have been explained: Yes  Patient, Family & other staff in agreement with plan of care: Yes  Clinical Impression Comments: Pt presents with impaired ADLs due to weakness, decreased activity tolerance, impaired balance, and impaired cognition/safety. Pt would not be safe to return home alone at this time due to deficits. Pt would benefit from short term rehab to improve his safety and independence in ADLs. Pt would benefit from higher level of care after short term rehab, too, as pt has not been adequately managing at home alone. Pt's sister expressed great concern with pt returning home, with numerous areas (heat not working, chair lift broke, not eating properly, etc.). Plan to work with pt during his stay.     Total Eval Time: 20
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
## 2020-01-17 NOTE — PROGRESS NOTES
Discharge Planner OT   Patient plan for discharge: pt did not verbalize. Pt's sister does not want pt to return home  Current status: Bed Mobility: Pt was able to roll onto his left side with Giovana. He needed ModA to transfer side lying to sitting EOB  Transfer Skills: Min-modA x2 sit to stand. Verbal cues and Giov[place] stand to sit  Bed to Chair/Chair to Bed: Min-modA x2   Upper Body Dressing: MaxA to don clean gown  Lower Body Dressing: MaxA to doff brieds and don clean pull ups, MaxA to don sock  Toileting: TotalA to perform homar-cares, pt incontinent of BM  Barriers to return to prior living situation: Living alone, cognition/safety, chair lift broke, weakness, decreased activity tolerance, high fall risk   Recommendations for discharge: Short term rehab  Rationale for recommendations: Pt presents with impaired ADLs due to weakness, decreased activity tolerance, impaired balance, and impaired cognition/safety. Pt would not be safe to return home alone at this time due to deficits. Pt would benefit from short term rehab to improve his safety and independence in ADLs. Pt would benefit from higher level of care after short term rehab, too, as pt has not been adequately managing at home alone. Pt's sister expressed great concern with pt returning home, with numerous areas (heat not working, chair lift broke, not eating properly, etc.). Plan to work with pt during his stay.        Entered by: Haley Lozoya 01/17/2020 12:47 PM

## 2020-01-18 NOTE — PROVIDER NOTIFICATION
Notified Dr. Shane that patient's blood pressures have been SBP <90's, and MAP<65. Dr. Shane said to restart levophed at lowest dose ordered.

## 2020-01-18 NOTE — PLAN OF CARE
Patient refusing BP every 15 mins, switched to Q 30 mins. BP stable, MAP maintaining >65, levophed running at 0.03 mcg/kg/min. Patient reused 0200 Blood sugar.
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
## 2020-01-18 NOTE — PLAN OF CARE
Discharge Planner PT   Patient plan for discharge: Home  Current status: Patient was in chair upon PT arrival. Agreeable to practice his functional transfers to and from the WC. Patient could not verbalize how he typically has his WC positioned and mutliple trials were attempted with various positions. Patient ultimately was able to transfer from chair to WC with min-mod Ax2 with WC on his right side. He then performed WC to bed transfer unsafely as PT had advised him to stop and wait until lines and tubes were positioned appropriately, however he moved quickly twisting himself up in lines and sat on very edge of bed.  Required min Ax2 for help with positioning in bed  Barriers to return to prior living situation: Unsafe set-up at home and progressing weakness and failure to thrive at home  Recommendations for discharge: SNF   Rationale for recommendations: due to current functional status and safety concerns       Entered by: Myra Cruz 01/17/2020 9:08 PM

## 2020-01-18 NOTE — PROVIDER NOTIFICATION
Patient aspirated on breakfast. Frequent coughing/choking after just one bite of scrambled egg. Lung sounds immediately coarse through out. Pt unable to continue eating. Dr. Crockett updated. Will monitor respiratory status.    Up to chair for lunch, ordered soft diet, again pt took one bite and aspirated.     Diet changed to pureed with nectar thickened liquids. Tolerates ensure shakes. Would benefit from swallow study when available.

## 2020-01-18 NOTE — PHARMACY
[facility]    Pharmacy      Antimicrobial Stewardship Note     Current antimicrobial therapy:  Anti-infectives (From now, onward)    Start     Dose/Rate Route Frequency Ordered Stop    20 191  piperacillin-tazobactam (ZOSYN) intermittent infusion 4.5 g      4.5 g  200 mL/hr over 30 Minutes Intravenous EVERY 6 HOURS 20 190            Indication: Sepsis    Days of Therapy: 3     Pertinent labs:  Creatinine   Creatinine   Date Value Ref Range Status   2020 0.74 0.66 - 1.25 mg/dL Final   2020 0.78 0.66 - 1.25 mg/dL Final   2020 0.81 0.66 - 1.25 mg/dL Final     WBC   WBC   Date Value Ref Range Status   2020 12.6 (H) 4.0 - 11.0 10e9/L Final   2020 15.8 (H) 4.0 - 11.0 10e9/L Final   2020 16.7 (H) 4.0 - 11.0 10e9/L Final     Procalcitonin   Procalcitonin   Date Value Ref Range Status   2020 0.08 ng/ml Final     Comment:     0.05-0.24 ng/ml Low risk of systemic bacterial infection. Local bacterial   infection possible.  Recommendation: Assess other clinical features of   infection. Discourage antibiotics unless strong clinical suspicion for serious   infection.     2019 0.32 ng/ml Final     Comment:     0.25-0.49 ng/ml  Possible early systemic infection or localized infection.     Recommendation: Encourage antibiotics only in the correct clinical context.   Consider obtaining blood cultures or other relevant cultures. Recheck PCT in   6-12 hours to ensure baseline low level. If repeat PCT is rising, consider   early systemic infection and consider starting antibiotics.       CRP   CRP Inflammation   Date Value Ref Range Status   2013 2.5 0.0 - 3.0 mg/L Final     Comment:      reference ranges have not been established.  C-reactive protein   values   should be interpreted as a comparison of serial measurements.            Recommendations/Interventions:  1. None    Edvin Marrero, [facility]  2020

## 2020-01-18 NOTE — PLAN OF CARE
Patient is alert and orientated x 4. Patient denies pain. MAP maintaining >65 with levophed running at 0.03 mcg/kg/min. One PIV is running D5 0.45 NS@ 100 mL/hr. Neuro's intact. LS clear, diminished in the bases. Oxygen saturation > 94% on RA. [place] patent, in place for strict I+O's, yellow urine with sediment. UOP > 30 mL/hr. Patient has SR 60-70's with prolonged QTc. Patient repositioning self in bed. Patient had small incontinent BM. Patient becoming increasingly more agitated and aggressive as the shift progressed. Afebrile. HRR. IV potassium replacement started, however pt removed his IV shortly after potassium started, refused to allow nurse to insert new IV. Given 40 mEq PO. Message sent for pharmacy to send up IV magnesium replacement.     Face to face report given with opportunity to observe patient.    Report given to Isabell Cespedes RN   1/18/2020  6:39 AM

## 2020-01-18 NOTE — PLAN OF CARE
Pt A&O. Weaned off Levophed. Pressure's have remained stable. HRR, Lungs clear diminished in bases. Denies pain.  Diet advanced tolerating well. [place] patent. Scattered scabs and bruises over body. Red blanchable area to buttocks barrier cream applied and antifungal cream.      Face to face report given with opportunity to observe patient.    Report given to Nehal Reyes RN   1/17/2020  7:41 PM

## 2020-01-18 NOTE — PLAN OF CARE
Pt A&O. HRR.  Weaned off levophed. Pressures and map have remained stable.  Diet changed to pureed with nectar tolerating well.     Face to face report given with opportunity to observe patient.    Report given to Nehal Reyes RN   1/18/2020  7:22 PM

## 2020-01-18 NOTE — PLAN OF CARE
Discharge Planner PT   Patient plan for discharge: SNF/LTC placement  Current status: Pivot transfer towards LLE req min to mod asst 1. Pt turns complete [place] which is unsafe and not instructed to do.   Barriers to return to prior living situation: weakness, debility, fall risk, multiple medical comorbidities, dependent transfers, standing and adls  Recommendations for discharge: LTC placement  Rationale for recommendations: clinical judgement.       Entered by: Betzaida Llamas 01/18/2020 12:01 PM

## 2020-01-18 NOTE — PROGRESS NOTES
Inpatient Physical Therapy Evaluation    Name: Tim Watson MRN# [id number]   Age: 69 year old YOB: 1950     Date of Consultation: 2020  Consultation is requested by:  Dr Rivera  Specific Consultation Request:  Evaluate and treat  Primary care provider: Ever Bergman    General Information:   Onset Date: 2020    History of Current Problem/Admission: Paroxysmal atrial fibrillation (H) [I48.0]  Urinary tract infection [N39.0]  Injury of head, initial encounter [S09.90XA]  Fall, initial encounter [W19.XXXA]  Altered mental status, unspecified altered mental status type [R41.82]    Prior Level of Function: IND with use of WC at home and stair lift, however reports it may not be working correctly right now. Sisters state he has lost weight the past 6 months and they have growing concerns about his mobility  Ambulation: 0 - Independent with use of WC for ambulation  Transferrin - Independent   Toiletin - Independent    Bathin - Independent   Dressin - Independent   Eatin - Independent   Communication: 0 - Understands/communicates without difficulty  Swallowin - swallows foods/liquids without difficulty  Cognition: 0 - no cognitive issues reported    Fall history within the last 6 months: Yes    Current Living Situation: Patient has 12 stairs to enter the home. He reports he has a chair lift as he is WC bound, however the lift may not be working currently. He fell going from his WC to/from the lift and was found by EMS after he did not  his meals on wheels. His family has concerns about him being able to stay in his home as he has been losing weight and has been getting progressively weaker     Current Equipment Used at Home: WC, stair lift, grab bars by toilet     Patient & Family Goals: return home safely     Past Medical History:   No past medical history on file.    Past Surgical History:  No past surgical history on file.    Medications:    Current Facility-Administered Medications   Medication     acetaminophen (TYLENOL) tablet 650 mg     dextrose 5% and 0.45% NaCl infusion     glucose gel 15-30 g    Or     dextrose 50 % injection 25-50 mL    Or     glucagon injection 1 mg     enoxaparin ANTICOAGULANT (LOVENOX) injection 40 mg     insulin aspart (NovoLOG) inj (RAPID ACTING)     insulin aspart (NovoLOG) inj (RAPID ACTING)     lidocaine (LMX4) kit     lidocaine 1 % 0.1-1 mL     magnesium sulfate 4 g in 100 mL sterile water (premade)     melatonin tablet 1 mg     miconazole (MICATIN/MICRO GUARD) 2 % powder     naloxone (NARCAN) injection 0.1-0.4 mg     norepinephrine (LEVOPHED) 16 mg in  mL infusion     ondansetron (ZOFRAN-ODT) ODT tab 4 mg    Or     ondansetron (ZOFRAN) injection 4 mg     piperacillin-tazobactam (ZOSYN) intermittent infusion 4.5 g     polyethylene glycol (MIRALAX/GLYCOLAX) Packet 17 g     potassium chloride (KLOR-CON) Packet 20-40 mEq     potassium chloride 10 mEq in 100 mL sterile water intermittent infusion (premix)     potassium chloride ER (K-DUR/KLOR-CON M) CR tablet 20-40 mEq     prochlorperazine (COMPAZINE) injection 5 mg    Or     prochlorperazine (COMPAZINE) tablet 5 mg    Or     prochlorperazine (COMPAZINE) Suppository 12.5 mg     sodium chloride (PF) 0.9% PF flush 3 mL     sodium chloride (PF) 0.9% PF flush 3 mL       Weight Bearing Status: L FWB, R BKA     Cognitive Status Examination:  Orientation: awake and alert  Level of Consciousness: alert  Follows Commands and Answers Questions: 100% of the time  Personal Safety and Judgement: Impaired  Memory: Difficult to assess  Comments: NA    Pain:   Currently in pain? No  Pain Location? NA  Pain Ratin (No pain)    Physical Therapy Evaluation:   Integumentary/Edema: WFL  ROM: WFL  Strength: Impaired L LE 4-/5, R LE NT. B UEs grossly 4/5  Bed Mobility: Required min Ax2 for positioning  Transfers: Chair to WC with 2 attempts with altering WC position as patient could  not verbalize how he typically prefers the chair placement, required min-mod Ax2 for safety and management of lines/tubes. WC to bed with min-mod Ax2  Gait: NA  Stairs: NA  Balance: NT  Sensory: WFL  Coordination: NT    Physical Therapy Interventions: Bed Mobility, Strengthening, Transfer Training and Progressive Activity/Exercise     Clinical Impressions:  Criteria for Skilled Therapeutic Intervention Met: Yes, treatment indicated  PT Diagnosis: Physical deconditioning and weakness associated with recent fall  Influenced by the following impairments: weakness, deconditioning  Functional limitations due to impairment: difficulty safely performing transfers  Clinical presentation: Evolving/Changing  Clinical presentation rationale: due to additional comorbidities influencing anticipated discharge recommendations  Clinical Decision making (complexity): Moderate Complexity  Frequency: Daily  Predicted Duration of Therapy Intervention (days/wks): 5 days  Anticipated Discharge Disposition: Transitional Care Facility   Anticipated Equipment Needs at Discharge: none  Risks and Benefits of therapy have been explained: Yes  Patient, Family & other staff in agreement with plan of care: Yes  Clinical Impression Comments: Presentation is consistent with weakness and physical deconditioning resulting in a fall at home. His family reports progressive weakness, weight-loss and concerns for his safety at home and they report his home set-up is hazardous.  Due to his current functional status and these concerns, I am recommending skilled nursing rehab with potential for long term placement in order to ensure his safety.    Total Eval Time: 25
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
## 2020-01-19 NOTE — PLAN OF CARE
Patient is alert and oriented x 4. Calling appropriately, and able to make needs known. Cares clustered, pt gets angry, agitated, and screams at staff when he is woken up. Patient repositioning self in bed frequently, BP fluctuating depending on patient position SBP ranging from 80's- 120's, MAP remained >63. HRR.    Face to face report given with opportunity to observe patient.    Report given to Aniyah Cespedes RN   1/19/2020  4:39 AM

## 2020-01-19 NOTE — PLAN OF CARE
Pt is becoming more agitated this shift. Refused to allow nurse to give Lovenox injection or apply powder.

## 2020-01-19 NOTE — PLAN OF CARE
Pt A&O. SBP ranging from 90's to 120's, MAP remains >65. [place] removed. Voiding without difficulty. IV fluids discontinued.

## 2020-01-19 NOTE — PROGRESS NOTES
Assessment completed see flowsheet.    LOC: alert  Others present: Patient and Family sistersIrais and Carmen  Information obtain primarily from sisters as Tim not wanting to talk    Dx: metabolic encephalopathy      Lives with: alone   Living at:  home  Transportation: YES [place]    Primary PCP: Ever Bergman  Insurance:  [facility]  Medicare IMM letter reviewed with Tim and sisters.    Support System:  Sisters   Homecare/PCA: chore services, Meals on Wheels  /County Services:   Not connected   : YES      How was the [facility] notification completed: telephone     Health Care Directive: NO information provided   Guardian: no  POA: Carmen and Irais looking into this    [facility]  Meds management: YES self    Adequate Resources for needs (housing, utilities, food/med): YES  Household chores: chore services received   Work/community/social activity: NO limited     ADLs: had been independent   Ambulation:wheelchair bound  Falls: yes  Nutrition: limited   Sleep: not addressed     Equipment used: wheelchair       Oxygen supplier: no      Does the supplier have valid oxygen orders: n/a    Able to Return to Prior Living Arrangements: NO- needs short term rehab, likely long term care.  Tim agreeable to returning to [place] if able for short term rehab.  First stated wanted to be closer to home but then agreed after further conversation with sisters and writer about benefit of returning to [place].  Irais and Carmen are requesting long term care as a transition from short term rehab.      Choice of Vendor: see above    Barriers:     FRANCES: low risk     Plan: short term rehab

## 2020-01-19 NOTE — PHARMACY
[facility]    Pharmacy      Antimicrobial Stewardship Note     Current antimicrobial therapy:  Anti-infectives (From now, onward)    Start     Dose/Rate Route Frequency Ordered Stop    20 1230  cefTRIAXone in d5w (ROCEPHIN) intermittent infusion 1 g      1 g  over 30 Minutes Intravenous DAILY 20 1138            IIndication: Sepsis/UTI     Days of Therapy: 2 [Day 4 antibiotics - was on Zosyn]     Pertinent labs:  Creatinine   Creatinine   Date Value Ref Range Status   2020 0.64 (L) 0.66 - 1.25 mg/dL Final   2020 0.74 0.66 - 1.25 mg/dL Final   2020 0.78 0.66 - 1.25 mg/dL Final     WBC   WBC   Date Value Ref Range Status   2020 12.6 (H) 4.0 - 11.0 10e9/L Final   2020 15.8 (H) 4.0 - 11.0 10e9/L Final   2020 16.7 (H) 4.0 - 11.0 10e9/L Final     Procalcitonin   Procalcitonin   Date Value Ref Range Status   2020 0.08 ng/ml Final     Comment:     0.05-0.24 ng/ml Low risk of systemic bacterial infection. Local bacterial   infection possible.  Recommendation: Assess other clinical features of   infection. Discourage antibiotics unless strong clinical suspicion for serious   infection.     2019 0.32 ng/ml Final     Comment:     0.25-0.49 ng/ml  Possible early systemic infection or localized infection.     Recommendation: Encourage antibiotics only in the correct clinical context.   Consider obtaining blood cultures or other relevant cultures. Recheck PCT in   6-12 hours to ensure baseline low level. If repeat PCT is rising, consider   early systemic infection and consider starting antibiotics.       CRP   CRP Inflammation   Date Value Ref Range Status   2013 2.5 0.0 - 3.0 mg/L Final     Comment:      reference ranges have not been established.  C-reactive protein   values   should be interpreted as a comparison of serial measurements.       Culture Results:   (20) C. Diff = negative  (20) MRSA Surveillance = negative  (20)  Blood  (1/16/20) Urine = mixed dominique     Recommendations/Interventions:  1. If also treating aspiration pneumonia, would switch back to Zosyn.  2. Consider addition of probiotic if antibiotic associated diarrhea (C. Diff checked 2 days ago was negative)    Edvin Marrero, [facility]  January 19, 2020

## 2020-01-20 NOTE — PLAN OF CARE
Discharge Planner SLP   Patient plan for discharge: Extended care facility   Current status: Honey thick liquids and pureed textures   Barriers to return to prior living situation: unknown  Recommendations for discharge: Diet as above.   Rationale for recommendations: Patient pocketing food and has a lot of solids left in his mouth post swallow. Patient became agitated when he was not allowed to eat more jordana cracker.        Entered by: Yadira Bowen 01/20/2020 9:59 AM

## 2020-01-20 NOTE — PHARMACY
[facility]    Pharmacy      Antimicrobial Stewardship Note     Current antimicrobial therapy:  Anti-infectives (From now, onward)    Start     Dose/Rate Route Frequency Ordered Stop    20 1230  cefTRIAXone in d5w (ROCEPHIN) intermittent infusion 1 g      1 g  over 30 Minutes Intravenous DAILY 20 1138            IIndication: Sepsis/UTI     Days of Therapy: 3 [Day 5 antibiotics - was on Zosyn]     Pertinent labs:  Creatinine   Creatinine   Date Value Ref Range Status   2020 0.64 (L) 0.66 - 1.25 mg/dL Final   2020 0.74 0.66 - 1.25 mg/dL Final   2020 0.78 0.66 - 1.25 mg/dL Final     WBC   WBC   Date Value Ref Range Status   2020 12.6 (H) 4.0 - 11.0 10e9/L Final   2020 15.8 (H) 4.0 - 11.0 10e9/L Final   2020 16.7 (H) 4.0 - 11.0 10e9/L Final     Procalcitonin   Procalcitonin   Date Value Ref Range Status   2020 0.08 ng/ml Final     Comment:     0.05-0.24 ng/ml Low risk of systemic bacterial infection. Local bacterial   infection possible.  Recommendation: Assess other clinical features of   infection. Discourage antibiotics unless strong clinical suspicion for serious   infection.     2019 0.32 ng/ml Final     Comment:     0.25-0.49 ng/ml  Possible early systemic infection or localized infection.     Recommendation: Encourage antibiotics only in the correct clinical context.   Consider obtaining blood cultures or other relevant cultures. Recheck PCT in   6-12 hours to ensure baseline low level. If repeat PCT is rising, consider   early systemic infection and consider starting antibiotics.       CRP   CRP Inflammation   Date Value Ref Range Status   2013 2.5 0.0 - 3.0 mg/L Final     Comment:      reference ranges have not been established.  C-reactive protein   values   should be interpreted as a comparison of serial measurements.       Culture Results:   (20) C. Diff = negative  (20) MRSA Surveillance = negative  (20)  Blood  (1/16/20) Urine = mixed dominique     Recommendations/Interventions:  1. Consider addition of probiotic if antibiotic associated diarrhea (C. Diff checked 2 days ago was negative)  2. Transition to oral when able    Edvin Marrero, [facility]  January 20, 2020

## 2020-01-20 NOTE — PLAN OF CARE
Transferred to room 3214 from 3122. Face to face report given with opportunity to observe patient from Damaris FLOYD.    Pt alert and orientated .

## 2020-01-20 NOTE — PLAN OF CARE
Discharge Planner PT   Patient plan for discharge:Acute care rehab?  Current status: Pivot squat transfer CGA1 to and from recliner. Seated exercises 10 reps: LAQ'S,marching, heel toe raises, glut sets, wheelchair push ups 6 reps.   Barriers to return to prior living situation: N/A  Recommendations for discharge: N/A  Rationale for recommendations: N/A       Entered by: Yancy Ahuja 01/20/2020 1:52 PM
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
## 2020-01-20 NOTE — PLAN OF CARE
"Discharge Planner OT   Patient plan for discharge: Pt did not verbalize today  Current status: Yamilex Rodarte NP, ordered OT to complete a cognitive evaluation. The Dewitt Cognitive Assessment (MoCA) was administered to assess pt's attention/concentration, executive functions, memory, language, visuoconstructional skills, conceptual thinking, calculations, and orientation. Pt participated in 5/8 subsections and then demanded OT to leave his room, stating \"I'm not doing this, we're done, get out\". Subsections pt completed are as follows: visuospatial/executive 1/5, naming 2/3, attention 5/6, language 2/2, and the initial memory (which is not scored). Unable to score assessment due to pt refusing.   Barriers to return to prior living situation: Impaired cognition  Recommendations for discharge: Short term rehab, with eventual long term care placement  Rationale for recommendations: Due to pt's current functional/cognitive status       Entered by: Haley Lozoya 01/20/2020 11:01 AM     "

## 2020-01-20 NOTE — PLAN OF CARE
Pt. Has had multiple incontinent episodes today. Urology saw pt. Went over findings with pt. And sister. Pt. Will be moved to [id number]

## 2020-01-20 NOTE — PROGRESS NOTES
01/20/20 0954   General Information   Onset Date 01/20/20   Start of Care Date 01/20/20   Referring Physician Dr. Crockett    Patient/Family Goals Statement Patient wants to eat solid food.    Swallowing Evaluation Bedside swallow evaluation   Behaviorial Observations Combative/agitated;Confabulatory;Confused;Impulsive;Inappropriate   Mode of current nutrition Oral diet   Type of oral diet Nectar - thick liquid;Dysphagia diet level 1   Respiratory Status O2 Supply   Type of O2 supply Nasal cannula   Clinical Swallow Evaluation   Oral Musculature generally intact   Structural Abnormalities none present   Dentition present and adequate   Mucosal Quality good   Mandibular Strength and Mobility intact   Oral Labial Strength and Mobility impaired coordination   Lingual Strength and Mobility impaired coordination   Velar Elevation intact   Buccal Strength and Mobility intact   Additional Documentation Yes   Swallow Eval   Feeding Assistance minimal assistance required   Clinical Swallow Eval: Honey Thick Liquid Texture Trial   Mode of Presentation, Honey cup;spoon   Volume of Honey Presented 3 oz   Oral Phase, Honey WFL   Pharyngeal Phase, Honey intact   Clinical Swallow Eval: Puree Solid Texture Trial   Mode of Presentation, Puree spoon;fed by clinician   Volume of Puree Presented 2 oz   Oral Phase, Puree WFL   Pharyngeal Phase, Puree intact   Clinical Swallow Eval: Solid Food Texture Trial   Mode of Presentation, Solid self-fed   Volume of Solid Food Presented 1 bite cracker   Oral Phase, Solid Residue in oral cavity   Pharyngeal Phase, Solid impaired   Swallow Compensations   Swallow Compensations Pacing;Reduce amounts   General Therapy Interventions   Planned Therapy Interventions Dysphagia Treatment   Dysphagia treatment Oropharyngeal exercise training;Instruction of safe swallow strategies;Modified diet education;Compensatory strategies for swallowing   Swallow Eval: Clinical Impressions   Skilled Criteria for  Therapy Intervention Skilled criteria met.  Treatment indicated.   Dysphagia Outcome Severity Scale (EDELMIRA) Level 3 - EDELMIRA   Treatment Diagnosis Oral pharyngeal dysphagia    Diet texture recommendations Honey thick liquids;Dysphagia diet level 1   Recommended Feeding/Eating Techniques alternate between small bites and sips of food/liquid;check mouth frequently for oral residue/pocketing;hard swallow w/ each bite or sip;maintain upright posture during/after eating for 30 mins;no straws;small sips/bites   Therapy Frequency 3x/week   Predicted Duration of Therapy Intervention (days/wks) 2 weeks   Anticipated Discharge Disposition extended care facility   Risks and Benefits of Treatment have been explained. Yes   Patient, family and/or staff in agreement with Plan of Care Yes   Clinical Impression Comments Patient demonstrates oral residue post swallow of solid textures. Recommend honey thick liquids and pureed textures and follow up with SLP. Patient was observed taking pills and did very well.    Total Evaluation Time   Total Evaluation Time (Minutes) 15
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
## 2020-01-20 NOTE — PLAN OF CARE
Patient is A+Ox4. Patient incontinent of bowel and bladder. At start of writer's shift patient was calling appropriately to be changed when soiled, later into the shift patient stopped calling to be changed. Staff now repositioning and checking for incontinence every 2 hours. VSS, afebrile. Barrier cream applied, due to incontinence. IV is SL. Patient refused HS blood glucose check.     Face to face report given with opportunity to observe patient.    Report given to Damaris Cespedes RN   1/20/2020  5:53 AM

## 2020-01-20 NOTE — PLAN OF CARE
Face to face report given with opportunity to observe patient.    Report given to MARIEL Calvert RN   1/19/2020  7:18 PM
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
## 2020-01-21 NOTE — PLAN OF CARE
Discharge Planner PT   Patient plan for discharge: Acute rehab vs SNF  Current status: Squat pivot transfer CGA1 sit to stand CGA1 sit to supine SBA1, L/E exercises 10 reps.   Barriers to return to prior living situation: N/A  Recommendations for discharge: N/A  Rationale for recommendations: N/A       Entered by: Yancy Ahuja 01/21/2020 1:27 PM

## 2020-01-21 NOTE — PLAN OF CARE
Discharge Planner SLP   Patient plan for discharge: did not report  Current status: Pureed, honey thick liquids, aspiration precautions (sit upright 90-degrees during intake and 30 min post intake, no straws, frequent oral cares)  Barriers to return to prior living situation: Patient requires oropharyngeal conditioning with independent use of swallow compensations to eat and drink safely.  Recommendations for discharge: subacute rehab   Rationale for recommendations: Patient required gains in swallow strength and use of compensations to eat and drink safely.  Patient at high risk for aspiration pneumonia due to impulsive eating behaviors, decreased oral control, and general swallow weakness.       Entered by: Rosemarie Hurtado 01/21/2020 7:58 AM

## 2020-01-21 NOTE — PROGRESS NOTES
Message received from Meek with [employer] [email or web address]) with fax number.  Referral information faxed.

## 2020-01-21 NOTE — PLAN OF CARE
A&O. VSS/AF O2 RA. Denies pain. Lacs to forehead, reddened dorsal surface left foot with numerous small scabs; vetral surface reddened, dry. Great toe left foot scabbed. Right AKA.  2 pea sized non blanchable spots right upper buttock, barrier cream applied. Psych consult this PM complete, see note.  at evening meal and 84 at HS, given 120ml apple juice. No sliding scale insulin ordered.  Face to face report given with opportunity to observe patient.    Report given to Armando Castillo RN   1/20/2020  11:46 PM
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
## 2020-01-21 NOTE — PROGRESS NOTES
This is a follow-up on Tim Watson.  I talked with Tim and his sister today and we reviewed his x-rays.  He has a greater than 2 cm stone burden in the right kidney and several stones in the left kidney with less than 2 cm of stone burden.  I recommended percutaneous nephrolithotomy at least on the right side and the family wanted to go to [place] but now they are telling me that Tim may be transferred to a [facility] nursing [facility] in [place].  It is thought that he is leaving tomorrow possibly.  I did already discussed the case with a urologist from [facility] in [place] and they were willing to proceed with further evaluation and possibly intervention since we do not have the equipment to do this case in [place], but given this new information that he is going to be moved to [place] they can do further evaluation there in urology.  I did discuss this with the  and it is important that when he does arrive to the nursing facility they start the process of getting him a urology referral.  20 minutes were spent with the patient x-rays current recommendations and coordination of care.

## 2020-01-21 NOTE — PLAN OF CARE
"Discharge Planner OT   Patient plan for discharge: Short term rehab  Current status: Pt had his call light on due to his \"need to be changed\" (incontinent-pt has had loose stools). Pt able to roll L/R with Cecily but needed TotalA for homar-cares and to doff/don a clean brief. Pt then transferred supine to sit with Cecily to transfer to the chair for his sheets to be cleaned. Pt completed a stand pivot transfer with the chair close to the bed as possible with Cecily x2 and verbal cues for safety. Pt needed cues to scoot back in the chair. Pt washed his face with a washcloth and water with set up and cues. Pt did not want to clean his mouth and already had clean clothes. Pt indicated he wanted to get back into bed already. Pt encouraged to remain in the chair while the aide changed his linens.   Barriers to return to prior living situation: Living alone, cognition/safety, chair lift broke, weakness, decreased activity tolerance, high fall risk   Recommendations for discharge: Short term rehab with transition to long term care  Rationale for recommendations: Due to pt's current functional/cognitive functioning.        Entered by: Haley Lozoya 01/21/2020 11:56 AM     "

## 2020-01-21 NOTE — PHARMACY
[facility]    Pharmacy      Antimicrobial Stewardship Note     Current antimicrobial therapy:  Anti-infectives (From now, onward)    Start     Dose/Rate Route Frequency Ordered Stop    20 1230  cefTRIAXone in d5w (ROCEPHIN) intermittent infusion 1 g      1 g  over 30 Minutes Intravenous DAILY 20 1138            Indication: Sepsis/UTI     Days of Therapy: 4 [Day 6 antibiotics - was on Zosyn]        Pertinent labs:  Creatinine   Creatinine   Date Value Ref Range Status   2020 0.64 (L) 0.66 - 1.25 mg/dL Final   2020 0.74 0.66 - 1.25 mg/dL Final   2020 0.78 0.66 - 1.25 mg/dL Final     WBC   WBC   Date Value Ref Range Status   2020 12.6 (H) 4.0 - 11.0 10e9/L Final   2020 15.8 (H) 4.0 - 11.0 10e9/L Final   2020 16.7 (H) 4.0 - 11.0 10e9/L Final     Procalcitonin   Procalcitonin   Date Value Ref Range Status   2020 0.08 ng/ml Final     Comment:     0.05-0.24 ng/ml Low risk of systemic bacterial infection. Local bacterial   infection possible.  Recommendation: Assess other clinical features of   infection. Discourage antibiotics unless strong clinical suspicion for serious   infection.     2019 0.32 ng/ml Final     Comment:     0.25-0.49 ng/ml  Possible early systemic infection or localized infection.     Recommendation: Encourage antibiotics only in the correct clinical context.   Consider obtaining blood cultures or other relevant cultures. Recheck PCT in   6-12 hours to ensure baseline low level. If repeat PCT is rising, consider   early systemic infection and consider starting antibiotics.       CRP   CRP Inflammation   Date Value Ref Range Status   2013 2.5 0.0 - 3.0 mg/L Final     Comment:      reference ranges have not been established.  C-reactive protein   values   should be interpreted as a comparison of serial measurements.       Culture Results:   (20) C. Diff = negative  (20) MRSA Surveillance = negative  (20)  Blood  (1/16/20) Urine = mixed dominique    NOTE: 7/9/19 urine = Aerococcus urinae     Recommendations/Interventions:  1. Consider addition of probiotic if antibiotic associated diarrhea (C. Diff checked 2 days ago was negative)  2. Transition to oral today as IV access has been lost  3.  If Aerococcus suspected based on history, guidelines per [place] Guide below:      Edvin Marrero, [facility]  January 21, 2020

## 2020-01-21 NOTE — PROGRESS NOTES
Tim did take depakote yesterday.  It is unclear the amount he has been sleeping.        I spoke with the [facility] staff who know him well. I spoke with them about the families desire for him to have a gaurdian appointed. The [facility] staff Darwin was quite surprised by this and she read the last psychiatry note from Jan13th, just a few days prior to admission and his sister was present at the appointment. His sister Christa reported that he was doing well at home and they did not feel the need for placement or gaurdianship. They reported the only help that he needed was meals and they were helping him with this and they were bringing him meals. Staff from [facility] do tell me that when reviewng their records, he was on depakote in the past.they do not know why it was stopped. His family was not aware why it was stopped though they recall him being on it.     Did not see patient today

## 2020-01-21 NOTE — PLAN OF CARE
VSS, afebrile, HRR, telemetry reads SR with OCC PVC's per ICU staff. Lungs are clear, bowels are active. Pt has been incontinent this shift, but is able to tell and call when he needs to be changed. Pt has been pleasant this shift. He has scattered bruises, scabs throughout, his scrotum is very exoriated, and there are two pea sized open areas on his right inner buttock. Pt has a honey thick liquid and pureed foods diet. He  denies pain and is alert and oriented x 4.   Face to face report given with opportunity to observe patient.    Report given to MARIEL Navas RN   1/21/2020  7:41 AM

## 2020-01-22 NOTE — PROGRESS NOTES
Left message for Aniyah Verduzco, [facility].  Spoke with [facility], in regards to inpatient mental health referral.  Faxed requested information.

## 2020-01-22 NOTE — PLAN OF CARE
Pt Disoriented to time and situation.  VSS except HR tachy at times.  No c/o pain.  Pt is asst  x1 to commode/chair.  Sometimes has outbursts toward staff  and he usually tells them to get out of room, he settles down pretty quickly.  Alarms in use and call light inreach and uses appropriately.  Loss of iv access, MD says to leave out and d/c'd antbtx.  Tele removed. Pt up in chair for meals-good appetite.      Face to face report given with opportunity to observe patient.    Report given to Armando Betts RN   1/21/2020  11:24 PM

## 2020-01-22 NOTE — PLAN OF CARE
Face to face report given with opportunity to observe patient.    Report given to Damaris Willard   1/22/2020  7:21 AM

## 2020-01-22 NOTE — PROGRESS NOTES
1 he did have one episode of loose stool and I am unsure if this is secondary to Depakote.  [facility]  I do have him twice a day dosing of Depakote and since there was an episode of diarrhea I did change it to ER.  Prior to discharge I had liver enzymes sodium and CBC checked.      .Platelet was 213 on 1/19. VPA can cause thrombocytopenia and will monitor for this and hyponatremia as well as elevated lfts.     Results for orders placed or performed during the hospital encounter of 01/16/20 (from the past 24 hour(s))   Glucose by meter   Result Value Ref Range    Glucose 92 70 - 99 mg/dL   Glucose by meter   Result Value Ref Range    Glucose 82 70 - 99 mg/dL   Hepatic panel   Result Value Ref Range    Bilirubin Direct <0.1 0.0 - 0.2 mg/dL    Bilirubin Total 0.1 (L) 0.2 - 1.3 mg/dL    Albumin 2.4 (L) 3.4 - 5.0 g/dL    Protein Total 6.4 (L) 6.8 - 8.8 g/dL    Alkaline Phosphatase 78 40 - 150 U/L    ALT 25 0 - 70 U/L    AST 15 0 - 45 U/L   Sodium   Result Value Ref Range    Sodium 138 133 - 144 mmol/L   CBC with platelets   Result Value Ref Range    WBC 7.5 4.0 - 11.0 10e9/L    RBC Count 4.81 4.4 - 5.9 10e12/L    Hemoglobin 13.9 13.3 - 17.7 g/dL    Hematocrit 41.9 40.0 - 53.0 %    MCV 87 78 - 100 fl    MCH 28.9 26.5 - 33.0 pg    MCHC 33.2 31.5 - 36.5 g/dL    RDW 13.2 10.0 - 15.0 %    Platelet Count 362 150 - 450 10e9/L       After reviewing the last few nursing notes I do see that he had multiple episodes of diarrhea today and I will be discontinuing the Depakote as it does appear there is a correlation.  Since he is discharging tomorrow and there has not been significant benefit from it, I am going to discontinue the Depakote instead of changing it to extended release and increase his gabapentin instead.

## 2020-01-22 NOTE — PLAN OF CARE
"Here for metabolic encephalopathy. Pt is A&O w/ slowed, slurred speech. Soft BP of 103/65, but otherwise VSS. Satting 97% on RA w/ no complaints of SOB or chest pain. Denies pain. LSs are clear, equal bilaterally. Remains on a dysphagia level 2 honey thickened liquids diet. Scrotal redness noted, but pt refused to allow me to look at his buttocks. Scattered bruising throughout w/ scabbing noted to left upper eyebrow. Scabbed pressure injury to left great toe remains free of drainage and open to air. Incontinent of urine x2. Pt refused to let lab draw his blood after two failed attempts. Pt was shouting at staff and telling them to \"get the hell out of here\". Multiple attempts were made to calm the pt and attempt the lab draw again w/ no success. Bed in lowest position. Call light in reach. ID band in place. Makes needs known. Will continue to monitor.        "

## 2020-01-22 NOTE — DOWNTIME EVENT NOTE
The EMR was down for 1.5 hours on 1/22/2020.    I was responsible for completing the paper charting during this time period.     The following information was re-entered into the system by Armando Willard: Flowsheet data, Intake and output and MAR    The following information will remain in the paper chart: N/A    Armando Willard  1/22/2020

## 2020-01-22 NOTE — PLAN OF CARE
Pt refused to work with OT this afternoon stating he wanted to sleep. OT attempted again about an hour later; unable to arouse verbally as pt was sleeping sound. OT will attempt again tomorrow.

## 2020-01-22 NOTE — PLAN OF CARE
Discharge Planner PT   Patient plan for discharge: Wants to go home?  Current status: Pt refused sit to stands or transfers but would perform L/E exercises 10 reps  Barriers to return to prior living situation: N/A  Recommendations for discharge: N/A  Rationale for recommendations: SNF vs Acute rehab       Entered by: Yancy Ahuja 01/22/2020 11:10 AM
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
## 2020-01-22 NOTE — PROGRESS NOTES
Received message back from [facility] advising the soonest they would have an opening would be Friday/early next week.

## 2020-01-22 NOTE — PROGRESS NOTES
Left message for [facility] RICHARD and Shraddha with [facility].  Spoke with Aniyah Verduzco with [facility], she advised that the [facility] would likely be the best place for Tim.  She advised to have the RICHARD with [facility] contact her with any questions. Updated sister, Irais.

## 2020-01-23 PROBLEM — S06.9XAA TBI (TRAUMATIC BRAIN INJURY) (H): Status: ACTIVE | Noted: 2020-01-01

## 2020-01-23 PROBLEM — F43.10 PTSD (POST-TRAUMATIC STRESS DISORDER): Status: ACTIVE | Noted: 2020-01-01

## 2020-01-23 PROBLEM — N39.0 URINARY TRACT INFECTION: Status: ACTIVE | Noted: 2020-01-01

## 2020-01-23 PROBLEM — I48.0 PAROXYSMAL ATRIAL FIBRILLATION (H): Status: ACTIVE | Noted: 2020-01-01
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
## 2020-01-23 PROBLEM — I63.412 CEREBROVASCULAR ACCIDENT (CVA) DUE TO EMBOLISM OF LEFT MIDDLE CEREBRAL ARTERY (H): Status: ACTIVE | Noted: 2020-01-01

## 2020-01-23 PROBLEM — R13.19 OTHER DYSPHAGIA: Status: ACTIVE | Noted: 2020-01-01

## 2020-01-23 PROBLEM — N20.0 KIDNEY STONE: Status: ACTIVE | Noted: 2020-01-01

## 2020-01-23 NOTE — PLAN OF CARE
Multiple attempts to find BLS non emergent ride this morning from [phone]. [facility] confirmed ride but wouldn't be able to  pt until 1500. Per phone call with Joelle in case management, [facility] unable [facility] when [facility] that late in the evening. Plan for ride tomorrow with [facility] at 0800. Joelle to update sister, Irais and pt.

## 2020-01-23 NOTE — PLAN OF CARE
Please refer to PCS charting by previous nurse at 0000. VSS alert et using call light appropriately for wants/needs  Face to face report given with opportunity to observe patient.    Report given to Emilia Lane RN   1/23/2020  7:20 AM
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
## 2020-01-23 NOTE — PLAN OF CARE
Pt was in bed upon arrival this afternoon and did not want to perform PT get out I want to nap. Asked again to clarify he stated no im trying to sleep.

## 2020-01-23 NOTE — PROGRESS NOTES
[facility] in [place].  Discharge summary was performed.  However a ride cannot be obtained until tomorrow morning now so discharge was canceled.    Patient remains alert has no real complaints to me at all.  Was very pleasant.  At times can be a little more obstinate with the nursing staff.    Vital signs remained stable.  120/83 for blood pressure heart rate 90 O2 sats are 98% on room air.  He is afebrile.    He is alert and oriented.    Heart rate is regular no murmurs pulses are equal.    Lungs clear to auscultation.    Abdomen is soft nontender.  Bowel sounds are positive.    Extremities status post right AKA.  Left extremity without edema.    Lab work was done to follow-up after starting the Depakote.  His platelet count is normal at 362,000.  White count 7500 hemoglobin 13.9.  Liver profile is completely normal.    We will discontinue the Accu-Cheks.  Routine vital signs.  Patient is medically stable.  I did sign out to the nurse practitioner who will be accepting the patient.  No acute issues currently.  Does need follow-up regarding his kidney stones with [facility].  Planning on discharge tomorrow morning.

## 2020-01-23 NOTE — PLAN OF CARE
Speech language pathologist attempted oral trials with patient.  Patient rejected interaction and demanded that clinician leave.  SLP plan to discharge due to noncompliance.

## 2020-01-23 NOTE — PLAN OF CARE
Pt refused to get out of bed or to perform exercises in bed. Pt had chocolate on his face and did wipe off independently after being provided with a wet wash cloth. Pt stated he is going to [place] tomorrow.

## 2020-01-23 NOTE — PROGRESS NOTES
Spoke with Meek with [facility], they are able to take Tim today.  Updated Tim, he is agreeable.  Updated sister, Irais.  Requesting non emergent ambulance transport, aware of cost and will pay.  Emilia FLOYD, aware of anticipated discharge and working on transport.      1230-Unable to secure transportation to get Tim to [place] by 4 pm.  [facility] arranged for 8am, tomorrow 1/24/20 to transport Tim down there.  Updated sister, Irais, Emilia FLOYD and Dr. Warren.  Left message in regards to ride time with Meek at [facility].          PAS-RR    Per DHS regulation, CTS team completed and submitted PAS-RR to [place] [employer] via the [employer]. CTS team advised SNF and they are aware a PAS-RR has been submitted.     CTS team reviewed with pt or health care agent that they may be contacted for a follow up appointment within 10 days of hospital discharge if SNF stay is <30 days. Contact information for [employer] was also provided.     Pt or health care agent verbalized understanding.     PAS-RR # [id number]

## 2020-01-23 NOTE — PLAN OF CARE
Discharge Planner SLP   Patient plan for discharge: short term rehab  Current status: Pureed (uniform consistency) NDD1 diet, honey liquids.  Patient minimally compliant with speech language pathology services and rejecting oral trials.  Barriers to return to prior living situation:  Patient was on NDD1 with honey during previous hospital stay and noncompliant with diet texture at home.  Patient at high risk for aspiration pneumonia with possible death due to impaired oral control with solids.    Recommendations for discharge: short term rehab with discharge diet consisting of honey liquids, pureed solids, aspiration precautions, and frequent oral cares.  Rationale for recommendations: Pt requires intensive rehab to regain swallow strength and coordination in order to upgrade diet.       Entered by: Rosemarie Hurtado 01/23/2020 2:24 PM

## 2020-01-23 NOTE — PLAN OF CARE
Pt A&O, calm and cooperative this evening.  Did want lights turned out and tv off all evening.  Pt laid in bed quietly, used call light to make needs known.  Did have episode of bwl incontinence, pt washed up and linens changed.  He does get up to commode with asst x1.  Good appetite.  Remains of dyphasia level 2 diet with honey thick liquids, HOB up  for 30 mins after eating.  Alarms in use and call light in reach.     Face to face report given with opportunity to observe patient.    Report given to Anayeli Betts RN   1/23/2020  1:12 AM

## 2020-01-24 NOTE — PROGRESS NOTES
Physical Therapy Discharge Summary    Reason for therapy discharge:    No further expectations of functional progress.    Progress towards therapy goal(s). See goals on Care Plan in [facility] electronic health record for goal details.  Goals met    Therapy recommendation(s):    Skilled therapy in SNF is recommended

## 2020-01-24 NOTE — PLAN OF CARE
Face to face report given with opportunity to observe patient.    Report given to MARIEL Hill RN   1/23/2020  7:14 PM

## 2020-01-24 NOTE — PLAN OF CARE
Occupational Therapy Discharge Summary    Reason for therapy discharge:    Discharged to transitional care facility.    Progress towards therapy goal(s). See goals on Care Plan in [facility] electronic health record for goal details.  Goals partially met.  Barriers to achieving goals:   discharge from facility and limited participation in therapy.    Therapy recommendation(s):    Continued therapy is recommended.  Rationale/Recommendations:  To improve pt's safety and independence in ADLs.

## 2020-01-24 NOTE — PLAN OF CARE
Patient discharged at 8:10 AM via wheel chair accompanied by other:[employer] . Prescriptions sent to patients preferred pharmacy. All belongings sent with patient.     Discharge instructions reviewed with [facility] belongings gathered and returned to patient. Pt confirms all belongings with him at departure from facility    Patient discharged to [facility].   Report called to Nursing Home:  MARIEL Agustin    Core Measures and Vaccines  Core Measures applicable during stay: No. If yes, state diagnosis:  Pneumonia and Influenza given prior to discharge, if indicated: pt refuses     Surgical Patient   Surgical Procedures during stay: none  Did patient receive discharge instruction on wound care and recognition of infection symptoms?     MISC  Follow up appointment made:  N/a  Home and hospital aquired medications returned to patient: n/a  Patient reports pain was well managed at discharge: Yes

## 2020-01-24 NOTE — PLAN OF CARE
Face to face report given with opportunity to observe patient.    Report given to MARIEL Jose RN   1/24/2020  6:59 AM

## 2020-01-24 NOTE — PLAN OF CARE
Alert, oriented to self and place. Frequently disoriented to time and situation. VSS. HR slightly tachycardic this afternoon 112bpm. Incontinent of bowl once and continent of bladder all day. Depakote stopped by April, NP to hopefully alleviate loose stool. Gabapentin increased. Non productive cough. Ride scheduled for 0800 1/24.

## 2020-01-24 NOTE — PLAN OF CARE
Pt A&O. VSS. Cares clustered d/t agitation. Uses call light appropriately. Will be discharging this [facility] [place].

## 2020-01-24 NOTE — PROGRESS NOTES
Name: iTm Watson    MRN#: [id number]    Reason for Hospitalization: Paroxysmal atrial fibrillation (H) [I48.0]  Urinary tract infection [N39.0]  Injury of head, initial encounter [S09.90XA]  Fall, initial encounter [W19.XXXA]  Altered mental status, unspecified altered mental status type [R41.82]    FRANCES: low     Discharge Date: 1/24/2020    Patient / Family response to discharge plan: agreeable     Follow-Up Appt:   Future Appointments   Date Time Provider Department Center   1/24/2020 11:00 AM Myra Cruz PT [facility]       Other Providers (Care Coordinator, County Services, PCA services etc): Yes: [facility]    Discharge Disposition: rehabilitation facility- [facility]    ALTAF Villanueva

## 2021-06-02 ENCOUNTER — RECORDS - HEALTHEAST (OUTPATIENT)
Dept: ADMINISTRATIVE | Facility: CLINIC | Age: 71
End: 2021-06-02
